# Patient Record
Sex: FEMALE | Race: WHITE | NOT HISPANIC OR LATINO | Employment: UNEMPLOYED
[De-identification: names, ages, dates, MRNs, and addresses within clinical notes are randomized per-mention and may not be internally consistent; named-entity substitution may affect disease eponyms.]

---

## 2017-06-10 ENCOUNTER — HEALTH MAINTENANCE LETTER (OUTPATIENT)
Age: 47
End: 2017-06-10

## 2018-06-16 ENCOUNTER — HEALTH MAINTENANCE LETTER (OUTPATIENT)
Age: 48
End: 2018-06-16

## 2021-04-29 ENCOUNTER — OFFICE VISIT (OUTPATIENT)
Dept: OBGYN | Facility: CLINIC | Age: 51
End: 2021-04-29
Payer: COMMERCIAL

## 2021-04-29 VITALS — DIASTOLIC BLOOD PRESSURE: 76 MMHG | WEIGHT: 195 LBS | SYSTOLIC BLOOD PRESSURE: 120 MMHG | BODY MASS INDEX: 34.27 KG/M2

## 2021-04-29 DIAGNOSIS — Z79.890 HORMONE REPLACEMENT THERAPY: ICD-10-CM

## 2021-04-29 DIAGNOSIS — N95.1 SYMPTOMATIC MENOPAUSAL OR FEMALE CLIMACTERIC STATES: Primary | ICD-10-CM

## 2021-04-29 PROCEDURE — 99202 OFFICE O/P NEW SF 15 MIN: CPT | Performed by: OBSTETRICS & GYNECOLOGY

## 2021-04-29 RX ORDER — MULTIVIT-MIN/IRON/FOLIC ACID/K 18-600-40
CAPSULE ORAL
COMMUNITY

## 2021-04-29 RX ORDER — ESTRADIOL 2 MG/1
2 TABLET ORAL
COMMUNITY
Start: 2021-03-18 | End: 2021-12-09

## 2021-04-29 RX ORDER — PROGESTERONE 200 MG/1
200 CAPSULE ORAL
COMMUNITY
Start: 2021-03-01 | End: 2021-12-09

## 2021-04-29 RX ORDER — SODIUM PHOSPHATE,MONO-DIBASIC 19G-7G/118
1 ENEMA (ML) RECTAL DAILY
COMMUNITY

## 2021-04-29 SDOH — ECONOMIC STABILITY: FOOD INSECURITY: WITHIN THE PAST 12 MONTHS, YOU WORRIED THAT YOUR FOOD WOULD RUN OUT BEFORE YOU GOT MONEY TO BUY MORE.: NOT ASKED

## 2021-04-29 SDOH — ECONOMIC STABILITY: TRANSPORTATION INSECURITY
IN THE PAST 12 MONTHS, HAS LACK OF TRANSPORTATION KEPT YOU FROM MEETINGS, WORK, OR FROM GETTING THINGS NEEDED FOR DAILY LIVING?: NOT ASKED

## 2021-04-29 SDOH — ECONOMIC STABILITY: FOOD INSECURITY: WITHIN THE PAST 12 MONTHS, THE FOOD YOU BOUGHT JUST DIDN'T LAST AND YOU DIDN'T HAVE MONEY TO GET MORE.: NOT ASKED

## 2021-04-29 SDOH — ECONOMIC STABILITY: INCOME INSECURITY: HOW HARD IS IT FOR YOU TO PAY FOR THE VERY BASICS LIKE FOOD, HOUSING, MEDICAL CARE, AND HEATING?: NOT ASKED

## 2021-04-29 SDOH — ECONOMIC STABILITY: TRANSPORTATION INSECURITY
IN THE PAST 12 MONTHS, HAS THE LACK OF TRANSPORTATION KEPT YOU FROM MEDICAL APPOINTMENTS OR FROM GETTING MEDICATIONS?: NOT ASKED

## 2021-04-29 ASSESSMENT — ANXIETY QUESTIONNAIRES
2. NOT BEING ABLE TO STOP OR CONTROL WORRYING: NOT AT ALL
7. FEELING AFRAID AS IF SOMETHING AWFUL MIGHT HAPPEN: NOT AT ALL
IF YOU CHECKED OFF ANY PROBLEMS ON THIS QUESTIONNAIRE, HOW DIFFICULT HAVE THESE PROBLEMS MADE IT FOR YOU TO DO YOUR WORK, TAKE CARE OF THINGS AT HOME, OR GET ALONG WITH OTHER PEOPLE: NOT DIFFICULT AT ALL
GAD7 TOTAL SCORE: 0
1. FEELING NERVOUS, ANXIOUS, OR ON EDGE: NOT AT ALL
6. BECOMING EASILY ANNOYED OR IRRITABLE: NOT AT ALL
5. BEING SO RESTLESS THAT IT IS HARD TO SIT STILL: NOT AT ALL
3. WORRYING TOO MUCH ABOUT DIFFERENT THINGS: NOT AT ALL

## 2021-04-29 ASSESSMENT — PATIENT HEALTH QUESTIONNAIRE - PHQ9
5. POOR APPETITE OR OVEREATING: NOT AT ALL
SUM OF ALL RESPONSES TO PHQ QUESTIONS 1-9: 0

## 2021-04-29 NOTE — PROGRESS NOTES
SUBJECTIVE:                                                   Marielos Bowden is a 51 year old female who presents to clinic today for the following health issue(s):  Patient presents with:  Consult: Discuss hRT, discuss options and sx's      HPI:  New patient --referred by Alissa Chamberlain --here today to establish care and to discuss current HRT regimen.  Began having significant hot flushes in fall 2018 and started on HRT with PCP.  Still having regular monthly menses at that time but was having constant interfering hot flushes.  Noticed improvement with 1mg estrace/200mg prometrium for the first year.  Felt she had worsening symptoms after 1yr and was increased to 2mg estrace daily.  Doing fairly well now --still having night sweats and hot flushes but tolerable.    Menses have become slightly less regular.  Has gone as long as 9 months without period; currently on 3 month of no menses.  +SA --no issues.  Denies vaginal dryness or pain.    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     Patient is sexually active, No obstetric history on file..  Using vasectomy for contraception.    reports that she has never smoked. She has never used smokeless tobacco.    STD testing offered?  Declined    Health maintenance updated:  yes    Today's PHQ-2 Score:   PHQ-2 ( 1999 [employer]) 6/30/2015   Q1: Little interest or pleasure in doing things 0   Q2: Feeling down, depressed or hopeless 0   PHQ-2 Score 0   Q1: Little interest or pleasure in doing things -   Q2: Feeling down, depressed or hopeless -   PHQ-2 Score -     Today's PHQ-9 Score:   PHQ-9 SCORE 4/29/2021   PHQ-9 Total Score 0     Today's DIANE-7  Score:   DIANE-7 SCORE 2021   Total Score 0       Problem list and histories reviewed & adjusted, as indicated.  Additional history: as documented.    Patient Active Problem List   Diagnosis     Family history of colon cancer     CARDIOVASCULAR SCREENING; LDL GOAL LESS THAN 160     History of basal cell carcinoma     Hormone replacement therapy     Past Surgical History:   Procedure Laterality Date     Z NONSPECIFIC PROCEDURE      PE tubes's x 12 secondary to ear infections     Z NONSPECIFIC PROCEDURE      L  TM reconstruction (2nd time)     Z NONSPECIFIC PROCEDURE  child     s/p tonsillectomy & adenoidectomy     Z NONSPECIFIC PROCEDURE  ,     2 NSVDs      Social History     Tobacco Use     Smoking status: Never Smoker     Smokeless tobacco: Never Used   Substance Use Topics     Alcohol use: No      Problem (# of Occurrences) Relation (Name,Age of Onset)    C.A.D. (1) Paternal Grandfather: CABG    Cancer (1) Maternal Grandmother: cervical cancer    Cancer - colorectal (2) Maternal Grandmother (80): recently  , Maternal Aunt: great aunt    Diabetes (1) Maternal Grandmother: great grandmother    Gynecology (1) Maternal Grandmother    Heart Disease (1) Daughter (15): ackerman parkinson with stent    Hypertension (2) Mother, Father    Lipids (1) Father    Lung Cancer (1) Mother:     No Known Problems (5) Sister, Brother, Maternal Grandfather, Paternal Grandmother, Other    Thyroid Disease (1) Maternal Aunt       Negative family history of: Breast Cancer            Current Outpatient Medications   Medication Sig     estradiol (ESTRACE) 2 MG tablet Take 2 mg by mouth     fish oil-omega-3 fatty acids (FISH OIL) 1000 MG capsule Take 3 g by mouth daily     glucosamine-chondroitin 500-400 MG CAPS per capsule Take 1 capsule by mouth daily     guaiFENesin (MUCINEX) 600 MG 12 hr tablet Take 600 mg by mouth daily     loratadine-pseudoePHEDrine (CLARITIN-D 24-HOUR)  MG per tablet Take 1 tablet by  mouth daily     progesterone (PROMETRIUM) 200 MG capsule Take 200 mg by mouth     Vitamin D, Cholecalciferol, 25 MCG (1000 UT) TABS      No current facility-administered medications for this visit.      Allergies   Allergen Reactions     No Known Drug Allergies        ROS:  12 point review of systems negative other than symptoms noted below or in the HPI.  Genitourinary: Hot Flashes, Night Sweats and Spotting  No urinary frequency or dysuria, bladder or kidney problems      OBJECTIVE:     /76   Wt 88.5 kg (195 lb)   LMP 01/20/2021 (Exact Date)   Breastfeeding No   BMI 34.27 kg/m    Body mass index is 34.27 kg/m .    Exam:  Constitutional:  Appearance: Well nourished, well developed alert, in no acute distress  Neurologic:  Mental Status:  Oriented X3.  Normal strength and tone, sensory exam grossly normal, mentation intact and speech normal.    Psychiatric:  Mentation appears normal and affect normal/bright.     In-Clinic Test Results:  No results found for this or any previous visit (from the past 24 hour(s)).    ASSESSMENT/PLAN:                                                        ICD-10-CM    1. Symptomatic menopausal or female climacteric states  N95.1    2. Hormone replacement therapy  Z79.890        Patient Instructions   Reviewed perimenopausal history and hormone replacement therapy to date.  Will continue with current dose of medication; would not recommend increasing dosage of estrogen.  Will continue to monitor cycles/periods and when cycles cease, will likely consider decreasing/weaning HRT.  Will return for yearly exam and mammogram with us in November.      Loretta Michel MD  [facility] [place]

## 2021-04-29 NOTE — PATIENT INSTRUCTIONS
Reviewed perimenopausal history and hormone replacement therapy to date.  Will continue with current dose of medication; would not recommend increasing dosage of estrogen.  Will continue to monitor cycles/periods and when cycles cease, will likely consider decreasing/weaning HRT.  Will return for yearly exam and mammogram with us in November.

## 2021-04-30 ASSESSMENT — ANXIETY QUESTIONNAIRES: GAD7 TOTAL SCORE: 0

## 2021-08-29 ENCOUNTER — HEALTH MAINTENANCE LETTER (OUTPATIENT)
Age: 51
End: 2021-08-29

## 2021-10-24 ENCOUNTER — HEALTH MAINTENANCE LETTER (OUTPATIENT)
Age: 51
End: 2021-10-24

## 2021-10-26 ENCOUNTER — OFFICE VISIT (OUTPATIENT)
Dept: DERMATOLOGY | Facility: CLINIC | Age: 51
End: 2021-10-26
Payer: COMMERCIAL

## 2021-10-26 VITALS — OXYGEN SATURATION: 96 % | HEART RATE: 85 BPM | SYSTOLIC BLOOD PRESSURE: 124 MMHG | DIASTOLIC BLOOD PRESSURE: 86 MMHG

## 2021-10-26 DIAGNOSIS — D18.01 ANGIOMA OF SKIN: ICD-10-CM

## 2021-10-26 DIAGNOSIS — L71.9 ROSACEA: Primary | ICD-10-CM

## 2021-10-26 DIAGNOSIS — L81.4 LENTIGO: ICD-10-CM

## 2021-10-26 DIAGNOSIS — D22.9 NEVUS: ICD-10-CM

## 2021-10-26 DIAGNOSIS — L82.1 SEBORRHEIC KERATOSIS: ICD-10-CM

## 2021-10-26 PROCEDURE — 99203 OFFICE O/P NEW LOW 30 MIN: CPT | Performed by: PHYSICIAN ASSISTANT

## 2021-10-26 RX ORDER — IVERMECTIN 10 MG/G
CREAM TOPICAL
Qty: 45 G | Refills: 3 | Status: SHIPPED | OUTPATIENT
Start: 2021-10-26 | End: 2022-11-01

## 2021-10-26 NOTE — LETTER
10/26/2021         RE: Marielos Bowden  [id number] [address] [id number]        Dear Colleague,    Thank you for referring your patient, Marielos Bowden, to the [facility]. Please see a copy of my visit note below.    HPI:   Chief complaints: Marielos Bowden is a pleasant 51 year old female who presents for Full skin cancer screening to rule out skin cancer   Last Skin Exam: n/a      1st Baseline: yes  Personal HX of Skin Cancer: no   Personal HX of Malignant Melanoma: no   Family HX of Skin Cancer / Malignant Melanoma: no  Personal HX of Atypical Moles:   no  Risk factors: history of sun exposure and burns  New / Changing lesions:yes spot on the breast - was removed previously but came back  Social History:   On review of systems, there are no further skin complaints, patient is feeling otherwise well.   ROS of the following were done and are negative: Constitutional, Eyes, Ears, Nose,   Mouth, Throat, Cardiovascular, Respiratory, GI, Genitourinary, Musculoskeletal,   Psychiatric, Endocrine, Allergic/Immunologic.    PHYSICAL EXAM:   BP (!) 131/90   Pulse 85   SpO2 96%   Skin exam performed as follows: Type 2 skin. Mood appropriate  Alert and Oriented X 3. Well developed, well nourished in no distress.  General appearance: Normal  Head including face: Normal  Eyes: conjunctiva and lids: Normal  Mouth: Lips, teeth, gums: Normal  Neck: Normal  Chest-breast/axillae: Normal  Back: Normal  Spleen and liver: Normal  Cardiovascular: Exam of peripheral vascular system by observation for swelling, varicosities, edema: Normal  Genitalia: groin, buttocks: Normal  Extremities: digits/nails (clubbing): Normal  Eccrine and Apocrine glands: Normal  Right upper extremity: Normal  Left upper extremity: Normal  Right lower extremity: Normal  Left lower extremity: Normal  Skin: Scalp and body hair: See below    Pt deferred exam of breasts, groin, buttocks: No    Other physical findings:  1.  Multiple pigmented macules on extremities and trunk  2. Multiple pigmented macules on face, trunk and extremities  3. Multiple vascular papules on trunk, arms and legs  4. Multiple scattered keratotic plaques  5. 7 mm fleshy papule on the right breast       Except as noted above, no other signs of skin cancer or melanoma.     ASSESSMENT/PLAN:   Benign Full skin cancer screening today. . Patient with history of none  Advised on monthly self exams and 1 year  Patient Education: Appropriate brochures given.    1. Multiple benign appearing nevi on arms, legs and trunk. Discussed ABCDEs of melanoma and sunscreen.   2. Multiple lentigos on arms, legs and trunk. Advised benign, no treatment needed.  3. Multiple scattered angiomas. Advised benign, no treatment needed.   4. Seborrheic keratosis on arms, legs and trunk. Advised benign, no treatment needed.  5. Dermal nevus vs nevus lipomatous on the right breast - she will consider removal; codes given for her to check with insurance  6. History of rosacea - Soolantra works well for her. Refill provided.               Follow-up: yearly    1.) Patient was asked about new and changing moles. YES  2.) Patient received a complete physical skin examination: YES  3.) Patient was counseled to perform a monthly self skin examination: YES  Scribed By: Nicci Vance, MS, PAKimmieC          Again, thank you for allowing me to participate in the care of your patient.        Sincerely,        Nicci Vance PA-C

## 2021-10-26 NOTE — PROGRESS NOTES
HPI:   Chief complaints: Marielos Bowden is a pleasant 51 year old female who presents for Full skin cancer screening to rule out skin cancer   Last Skin Exam: n/a      1st Baseline: yes  Personal HX of Skin Cancer: no   Personal HX of Malignant Melanoma: no   Family HX of Skin Cancer / Malignant Melanoma: no  Personal HX of Atypical Moles:   no  Risk factors: history of sun exposure and burns  New / Changing lesions:yes spot on the breast - was removed previously but came back  Social History:   On review of systems, there are no further skin complaints, patient is feeling otherwise well.   ROS of the following were done and are negative: Constitutional, Eyes, Ears, Nose,   Mouth, Throat, Cardiovascular, Respiratory, GI, Genitourinary, Musculoskeletal,   Psychiatric, Endocrine, Allergic/Immunologic.    PHYSICAL EXAM:   BP (!) 131/90   Pulse 85   SpO2 96%   Skin exam performed as follows: Type 2 skin. Mood appropriate  Alert and Oriented X 3. Well developed, well nourished in no distress.  General appearance: Normal  Head including face: Normal  Eyes: conjunctiva and lids: Normal  Mouth: Lips, teeth, gums: Normal  Neck: Normal  Chest-breast/axillae: Normal  Back: Normal  Spleen and liver: Normal  Cardiovascular: Exam of peripheral vascular system by observation for swelling, varicosities, edema: Normal  Genitalia: groin, buttocks: Normal  Extremities: digits/nails (clubbing): Normal  Eccrine and Apocrine glands: Normal  Right upper extremity: Normal  Left upper extremity: Normal  Right lower extremity: Normal  Left lower extremity: Normal  Skin: Scalp and body hair: See below    Pt deferred exam of breasts, groin, buttocks: No    Other physical findings:  1. Multiple pigmented macules on extremities and trunk  2. Multiple pigmented macules on face, trunk and extremities  3. Multiple vascular papules on trunk, arms and legs  4. Multiple scattered keratotic plaques  5. 7 mm fleshy papule on the right breast        Except as noted above, no other signs of skin cancer or melanoma.     ASSESSMENT/PLAN:   Benign Full skin cancer screening today. . Patient with history of none  Advised on monthly self exams and 1 year  Patient Education: Appropriate brochures given.    1. Multiple benign appearing nevi on arms, legs and trunk. Discussed ABCDEs of melanoma and sunscreen.   2. Multiple lentigos on arms, legs and trunk. Advised benign, no treatment needed.  3. Multiple scattered angiomas. Advised benign, no treatment needed.   4. Seborrheic keratosis on arms, legs and trunk. Advised benign, no treatment needed.  5. Dermal nevus vs nevus lipomatous on the right breast - she will consider removal; codes given for her to check with insurance  6. History of rosacea - Soolantra works well for her. Refill provided.               Follow-up: yearly    1.) Patient was asked about new and changing moles. YES  2.) Patient received a complete physical skin examination: YES  3.) Patient was counseled to perform a monthly self skin examination: YES  Scribed By: Nicci Vance MS, PA-C

## 2021-12-09 ENCOUNTER — TELEPHONE (OUTPATIENT)
Dept: OBGYN | Facility: CLINIC | Age: 51
End: 2021-12-09
Payer: COMMERCIAL

## 2021-12-09 NOTE — TELEPHONE ENCOUNTER
Pt is req refills on her progesterone (PROMETRIUM) 200 MG capsule- pharmacy states they are not able to fill it, their database is saying Dr. Michel is not able to do this. Pt is scheduled for her yrly on 12/16 already    Pharmacy: [id number] - [place], [place] - [address]

## 2021-12-09 NOTE — TELEPHONE ENCOUNTER
Duplicate - pt sent mychart and that was sent to Dr Michel as well.    Karla De Jesus RN on 12/9/2021 at 11:51 AM

## 2021-12-15 NOTE — PROGRESS NOTES
Marielos is a 51 year old  female who presents for annual exam.     Besides routine health maintenance,  she would like to discuss the fact that she went 9 months with out bleeding and then has been having some spotting since . She would also like to revisit her hormones.    HPI:  Here today for yearly exam --doing well.  Perimenopausal.  Has been on HRT since 2018 for hot flushes/night sweats.  Was increased to 2mg daily estrace last year.  Hot flushes and night sweats have been fairly well controlled.  Had not had period since Feb and now has had intermittent bleeding/spotting since .  Started with very heavy bleeding x 10d and now having spotting here and there.  Initially had some upper thigh soreness/cramping.   Nothing since.  +SA but infrequent due to lower libido.  No dryness or pain but does occ using lubricant.  No bowel/bladder issues.  Denies leaking or incontinence.  Gets up on occ overnight to void.    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); 2 daughters --youngest will graduate from college this month andwill move home as she figures out next steps  -staying active in the summer; likes to hike but  doesn't like to walk in the winter; tries to park further away, uses stairs, etc  +mammo today; +SBE on occ --no concerns  No PCP --believes she had bloodwork in the last couple of years but reports no history of diabetes or elevated cholesterol, etc  -had colonoscopy in 2019 (see care everywhere)--will repeat in 5yrs  -has never had bone scan  -completed both covid vaccine and booster; had flu shot already with work        GYNECOLOGIC HISTORY:    No LMP recorded. (Menstrual status: Irregular Periods).    Regular menses? No, went 9 months without a menses and then had some spotting  Her current contraception method is: vasectomy.  She  reports that she has never smoked. She has never used smokeless tobacco.    Patient is sexually  active.  STD testing offered?  Declined  Last PHQ-9 score on record =   PHQ-9 SCORE 2021   PHQ-9 Total Score 0     Last GAD7 score on record =   DIANE-7 SCORE 2021   Total Score 0     Alcohol Score = 0    HEALTH MAINTENANCE:  Cholesterol:2018   Total= 274, Triglycerides=87, HDL=80, DZQ=988, FBS=93, TSH (2020)=2.11  Last Mammo: One year ago, Result: Normal, Next Mammo: Today  Pap:   Lab Results   Component Value Date    PAP NIL 2014    PAP NIL 2009     Colonoscopy: 2019, Result: Polyp, Next Colonoscopy: 3 years.  Dexa:  N/a    Health maintenance updated:  yes    HISTORY:  OB History    Para Term  AB Living   2 2 2 0 0 2   SAB IAB Ectopic Multiple Live Births   0 0 0 0 2      # Outcome Date GA Lbr Axel/2nd Weight Sex Delivery Anes PTL Lv   2 Term 1937    F    JIN   1 Term  38w0d   F    JIN       Patient Active Problem List   Diagnosis     Family history of colon cancer     CARDIOVASCULAR SCREENING; LDL GOAL LESS THAN 160     History of basal cell carcinoma     Hormone replacement therapy     Past Surgical History:   Procedure Laterality Date     ZZC NONSPECIFIC PROCEDURE      PE tubes's x 12 secondary to ear infections     ZZC NONSPECIFIC PROCEDURE      L  TM reconstruction (2nd time)     ZZC NONSPECIFIC PROCEDURE  child     s/p tonsillectomy & adenoidectomy     ZZC NONSPECIFIC PROCEDURE  ,     2 NSVDs      Social History     Tobacco Use     Smoking status: Never Smoker     Smokeless tobacco: Never Used   Substance Use Topics     Alcohol use: No      Problem (# of Occurrences) Relation (Name,Age of Onset)    Basal cell carcinoma (2) Father, Paternal Grandmother    C.A.D. (1) Paternal Grandfather: CABG    Cancer (1) Maternal Grandmother: cervical cancer    Cancer - colorectal (2) Maternal Grandmother (80): recently  , Maternal Aunt: great aunt    Diabetes (1) Maternal Grandmother: great grandmother    Gynecology (1) Maternal Grandmother     "Heart Disease (1) Daughter (15): meka parkinson with stent    Hypertension (2) Mother, Father    Lipids (1) Father    Lung Cancer (1) Mother: 2016    No Known Problems (4) Sister, Brother, Maternal Grandfather, Other    Skin Cancer (1) Maternal Aunt       Negative family history of: Breast Cancer            Current Outpatient Medications   Medication Sig     estradiol (ESTRACE) 2 MG tablet Take 1 tablet (2 mg) by mouth daily     fish oil-omega-3 fatty acids (FISH OIL) 1000 MG capsule Take 3 g by mouth daily      glucosamine-chondroitin 500-400 MG CAPS per capsule Take 1 capsule by mouth daily     guaiFENesin (MUCINEX) 600 MG 12 hr tablet Take 600 mg by mouth daily     ivermectin (SOOLANTRA) 1 % cream Apply to the affected areas of the face once daily.     loratadine-pseudoePHEDrine (CLARITIN-D 24-HOUR)  MG per tablet Take 1 tablet by mouth daily     progesterone (PROMETRIUM) 200 MG capsule Take 1 capsule (200 mg) by mouth daily     Vitamin D, Cholecalciferol, 25 MCG (1000 UT) TABS      No current facility-administered medications for this visit.     Allergies   Allergen Reactions     No Known Drug Allergies        Past medical, surgical, social and family histories were reviewed and updated in EPIC.    ROS:   12 point review of systems negative other than symptoms noted below or in the HPI.  No urinary frequency or dysuria, bladder or kidney problems    EXAM:  /80   Pulse 70   Ht 1.6 m (5' 3\")   Wt 83 kg (183 lb)   Breastfeeding No   BMI 32.42 kg/m     BMI: Body mass index is 32.42 kg/m .    PHYSICAL EXAM:  Constitutional:   Appearance: Well nourished, well developed, alert, in no acute distress  Neck:  Lymph Nodes:  No lymphadenopathy present    Thyroid:  Gland size normal, nontender, no nodules or masses present  on palpation  Chest:  Respiratory Effort:  Breathing unlabored  Cardiovascular:    Heart: Auscultation:  Regular rate, normal rhythm, no murmurs present  Breasts: Inspection of Breasts:  " No lymphadenopathy present., Palpation of Breasts and Axillae:  No masses present on palpation, no breast tenderness., Axillary Lymph Nodes:  No lymphadenopathy present. and No nodularity, asymmetry or nipple discharge bilaterally.  Gastrointestinal:   Abdominal Examination:  Abdomen nontender to palpation, tone normal without rigidity or guarding, no masses present, umbilicus without lesions   Liver and Spleen:  No hepatomegaly present, liver nontender to palpation    Hernias:  No hernias present  Lymphatic: Lymph Nodes:  No other lymphadenopathy present  Skin:  General Inspection:  No rashes present, no lesions present, no areas of  discoloration  Neurologic:    Mental Status:  Oriented X3.  Normal strength and tone, sensory exam                grossly normal, mentation intact and speech normal.    Psychiatric:   Mentation appears normal and affect normal/bright.         Pelvic Exam:  External Genitalia:     Normal appearance for age, no discharge present, no tenderness present, no inflammatory lesions present, color normal  Vagina:     Normal vaginal vault without central or paravaginal defects, no discharge present, no inflammatory lesions present, no masses present  Bladder:     Nontender to palpation  Urethra:   Urethral Body:  Urethra palpation normal, urethra structural support normal   Urethral Meatus:  No erythema or lesions present  Cervix:     Appearance healthy, no lesions present, nontender to palpation, no bleeding present  Uterus:     Uterus: firm, normal sized and nontender, anteverted in position.  Enlarged 12-14wk size uterus; firm, mobile, nontender  Adnexa:     No adnexal tenderness present, no adnexal masses present  Perineum:     Perineum within normal limits, no evidence of trauma, no rashes or skin lesions present  Anus:     Anus within normal limits, no hemorrhoids present  Inguinal Lymph Nodes:     No lymphadenopathy present  Pubic Hair:     Normal pubic hair distribution for  age  Genitalia and Groin:     No rashes present, no lesions present, no areas of discoloration, no masses present      COUNSELING:   Reviewed preventive health counseling, as reflected in patient instructions  Special attention given to:        Regular exercise       Healthy diet/nutrition       (Leonor)menopause management    BMI: Body mass index is 32.42 kg/m .  Weight management plan: Discussed healthy diet and exercise guidelines    ASSESSMENT:  51 year old female with satisfactory annual exam.    ICD-10-CM    1. Encounter for gynecological examination without abnormal finding  Z01.419 Pap thin layer screen with HPV - recommended age 30 - 65 years   2. Symptomatic menopausal or female climacteric states  N95.1 estradiol (ESTRACE) 2 MG tablet     progesterone (PROMETRIUM) 200 MG capsule   3. Hormone replacement therapy  Z79.890 estradiol (ESTRACE) 2 MG tablet     progesterone (PROMETRIUM) 200 MG capsule   4. Menometrorrhagia  N92.1 ENDOMETRIAL BIOPSY W/O CERVICAL DILATION     Surgical pathology exam     US Transvaginal Pelvic Non-OB   5. Bulky or enlarged uterus  N85.2 US Transvaginal Pelvic Non-OB       PLAN:  Patient Instructions   Follow up with your primary care provider for your other medical problems.  Continue self breast exam.  Increase physical activity and exercise.  Lab and pap smear results will be called to the patient.  Co-testing done today and will repeat in 5yrs if normal.  WHI study was discussed in detail including current information.  The patient requests continuation of hormone therapy.  Will consider slowly weaning dosage of HRT --discussed possibility of alternating 2mg and 1mg dosing over the next year.  Will consider.  Usual safety and preventative measures counseling done.  BMI >25  Weight loss encouraged.  Endometrial biopsy done today due to irregular bleeding/metrorrhagia.  I will call Marielos with biopsy results next week.  Will also arrange pelvic ultrasound due to bleeding and uterine  enlargement appreciated on exam today.        Loretta Michel MD  INDICATIONS:                                                    Is a pregnancy test required: No.  Was a consent obtained?  No (verbal consent obtained)    Having endometrial biopsy for menometrorrhagia    Today's PHQ-2 Score:   PHQ-2 ( 1999 [employer]) 6/30/2015   Q1: Little interest or pleasure in doing things 0   Q2: Feeling down, depressed or hopeless 0   PHQ-2 Score 0   Q1: Little interest or pleasure in doing things -   Q2: Feeling down, depressed or hopeless -   PHQ-2 Score -       PROCEDURE;                                                      A speculum was placed in the vagina and cervix prepped with betadine. A tenaculum was attached to the cervix. A small plastic 5 mm Pipelle syringe curette was inserted into the cervical canal. The uterus was sounded to 11 cm's. A vigorous four quadrant biopsy was performed, removing amount moderate  of tissue. The speculum was removed. This tissue was placed in Formalin and sent to pathology.    The patient tolerated the procedure  well and she reported there was no cramping.      POST PROCEDURE;                                                      There  was no cramping at the time of discharge. She  tolerated the procedure well with minimal discomfort. There were no complications. Patient was discharged in stable condition.    Patient advised to call the clinic if severe pelvic pain, fever or heavy bleeding.    Loretta Michel MD

## 2021-12-16 ENCOUNTER — OFFICE VISIT (OUTPATIENT)
Dept: OBGYN | Facility: CLINIC | Age: 51
End: 2021-12-16
Payer: COMMERCIAL

## 2021-12-16 ENCOUNTER — ANCILLARY PROCEDURE (OUTPATIENT)
Dept: MAMMOGRAPHY | Facility: CLINIC | Age: 51
End: 2021-12-16
Payer: COMMERCIAL

## 2021-12-16 VITALS
BODY MASS INDEX: 32.43 KG/M2 | SYSTOLIC BLOOD PRESSURE: 110 MMHG | HEIGHT: 63 IN | WEIGHT: 183 LBS | HEART RATE: 70 BPM | DIASTOLIC BLOOD PRESSURE: 80 MMHG

## 2021-12-16 DIAGNOSIS — N92.1 MENOMETRORRHAGIA: ICD-10-CM

## 2021-12-16 DIAGNOSIS — Z79.890 HORMONE REPLACEMENT THERAPY: ICD-10-CM

## 2021-12-16 DIAGNOSIS — N95.1 SYMPTOMATIC MENOPAUSAL OR FEMALE CLIMACTERIC STATES: ICD-10-CM

## 2021-12-16 DIAGNOSIS — Z01.419 ENCOUNTER FOR GYNECOLOGICAL EXAMINATION WITHOUT ABNORMAL FINDING: Primary | ICD-10-CM

## 2021-12-16 DIAGNOSIS — Z12.31 VISIT FOR SCREENING MAMMOGRAM: ICD-10-CM

## 2021-12-16 DIAGNOSIS — N85.2 BULKY OR ENLARGED UTERUS: ICD-10-CM

## 2021-12-16 PROCEDURE — 99396 PREV VISIT EST AGE 40-64: CPT | Mod: 25 | Performed by: OBSTETRICS & GYNECOLOGY

## 2021-12-16 PROCEDURE — G0123 SCREEN CERV/VAG THIN LAYER: HCPCS | Performed by: OBSTETRICS & GYNECOLOGY

## 2021-12-16 PROCEDURE — 77067 SCR MAMMO BI INCL CAD: CPT | Mod: TC | Performed by: RADIOLOGY

## 2021-12-16 PROCEDURE — 87624 HPV HI-RISK TYP POOLED RSLT: CPT | Performed by: OBSTETRICS & GYNECOLOGY

## 2021-12-16 PROCEDURE — 77063 BREAST TOMOSYNTHESIS BI: CPT | Mod: TC | Performed by: RADIOLOGY

## 2021-12-16 PROCEDURE — 58100 BIOPSY OF UTERUS LINING: CPT | Performed by: OBSTETRICS & GYNECOLOGY

## 2021-12-16 PROCEDURE — 88305 TISSUE EXAM BY PATHOLOGIST: CPT | Performed by: PATHOLOGY

## 2021-12-16 PROCEDURE — 99213 OFFICE O/P EST LOW 20 MIN: CPT | Mod: 25 | Performed by: OBSTETRICS & GYNECOLOGY

## 2021-12-16 RX ORDER — PROGESTERONE 200 MG/1
200 CAPSULE ORAL DAILY
Qty: 90 CAPSULE | Refills: 3 | Status: SHIPPED | OUTPATIENT
Start: 2021-12-16 | End: 2022-12-29

## 2021-12-16 RX ORDER — ESTRADIOL 2 MG/1
2 TABLET ORAL DAILY
Qty: 90 TABLET | Refills: 3 | Status: SHIPPED | OUTPATIENT
Start: 2021-12-16 | End: 2022-12-29

## 2021-12-16 ASSESSMENT — ANXIETY QUESTIONNAIRES
GAD7 TOTAL SCORE: 0
6. BECOMING EASILY ANNOYED OR IRRITABLE: NOT AT ALL
7. FEELING AFRAID AS IF SOMETHING AWFUL MIGHT HAPPEN: NOT AT ALL
IF YOU CHECKED OFF ANY PROBLEMS ON THIS QUESTIONNAIRE, HOW DIFFICULT HAVE THESE PROBLEMS MADE IT FOR YOU TO DO YOUR WORK, TAKE CARE OF THINGS AT HOME, OR GET ALONG WITH OTHER PEOPLE: NOT DIFFICULT AT ALL
3. WORRYING TOO MUCH ABOUT DIFFERENT THINGS: NOT AT ALL
2. NOT BEING ABLE TO STOP OR CONTROL WORRYING: NOT AT ALL
5. BEING SO RESTLESS THAT IT IS HARD TO SIT STILL: NOT AT ALL
1. FEELING NERVOUS, ANXIOUS, OR ON EDGE: NOT AT ALL

## 2021-12-16 ASSESSMENT — PATIENT HEALTH QUESTIONNAIRE - PHQ9
SUM OF ALL RESPONSES TO PHQ QUESTIONS 1-9: 0
5. POOR APPETITE OR OVEREATING: NOT AT ALL

## 2021-12-16 ASSESSMENT — MIFFLIN-ST. JEOR: SCORE: 1404.21

## 2021-12-16 NOTE — PATIENT INSTRUCTIONS
Follow up with your primary care provider for your other medical problems.  Continue self breast exam.  Increase physical activity and exercise.  Lab and pap smear results will be called to the patient.  Co-testing done today and will repeat in 5yrs if normal.  WHI study was discussed in detail including current information.  The patient requests continuation of hormone therapy.  Will consider slowly weaning dosage of HRT --discussed possibility of alternating 2mg and 1mg dosing over the next year.  Will consider.  Usual safety and preventative measures counseling done.  BMI >25  Weight loss encouraged.  Endometrial biopsy done today due to irregular bleeding/metrorrhagia.  I will call Marielos with biopsy results next week.  Will also arrange pelvic ultrasound due to bleeding and uterine enlargement appreciated on exam today.

## 2021-12-17 ASSESSMENT — ANXIETY QUESTIONNAIRES: GAD7 TOTAL SCORE: 0

## 2021-12-20 LAB
PATH REPORT.COMMENTS IMP SPEC: NORMAL
PATH REPORT.COMMENTS IMP SPEC: NORMAL
PATH REPORT.FINAL DX SPEC: NORMAL
PATH REPORT.GROSS SPEC: NORMAL
PATH REPORT.MICROSCOPIC SPEC OTHER STN: NORMAL
PATH REPORT.RELEVANT HX SPEC: NORMAL
PHOTO IMAGE: NORMAL

## 2021-12-22 LAB
BKR LAB AP GYN ADEQUACY: NORMAL
BKR LAB AP GYN INTERPRETATION: NORMAL
BKR LAB AP HPV REFLEX: NORMAL
BKR LAB AP PREVIOUS ABNORMAL: NORMAL
PATH REPORT.COMMENTS IMP SPEC: NORMAL
PATH REPORT.COMMENTS IMP SPEC: NORMAL
PATH REPORT.RELEVANT HX SPEC: NORMAL

## 2021-12-24 LAB
HUMAN PAPILLOMA VIRUS 16 DNA: NEGATIVE
HUMAN PAPILLOMA VIRUS 18 DNA: NEGATIVE
HUMAN PAPILLOMA VIRUS FINAL DIAGNOSIS: NORMAL
HUMAN PAPILLOMA VIRUS OTHER HR: NEGATIVE

## 2022-01-02 ENCOUNTER — MYC MEDICAL ADVICE (OUTPATIENT)
Dept: OBGYN | Facility: CLINIC | Age: 52
End: 2022-01-02
Payer: COMMERCIAL

## 2022-02-01 ENCOUNTER — LAB REQUISITION (OUTPATIENT)
Dept: LAB | Facility: CLINIC | Age: 52
End: 2022-02-01

## 2022-02-01 PROCEDURE — U0003 INFECTIOUS AGENT DETECTION BY NUCLEIC ACID (DNA OR RNA); SEVERE ACUTE RESPIRATORY SYNDROME CORONAVIRUS 2 (SARS-COV-2) (CORONAVIRUS DISEASE [COVID-19]), AMPLIFIED PROBE TECHNIQUE, MAKING USE OF HIGH THROUGHPUT TECHNOLOGIES AS DESCRIBED BY CMS-2020-01-R: HCPCS | Performed by: INTERNAL MEDICINE

## 2022-02-02 LAB — SARS-COV-2 RNA RESP QL NAA+PROBE: NOT DETECTED

## 2022-02-08 PROCEDURE — U0005 INFEC AGEN DETEC AMPLI PROBE: HCPCS | Performed by: INTERNAL MEDICINE

## 2022-02-09 ENCOUNTER — LAB REQUISITION (OUTPATIENT)
Dept: LAB | Facility: CLINIC | Age: 52
End: 2022-02-09

## 2022-02-09 LAB — SARS-COV-2 RNA RESP QL NAA+PROBE: NEGATIVE

## 2022-02-22 ENCOUNTER — LAB REQUISITION (OUTPATIENT)
Dept: LAB | Facility: CLINIC | Age: 52
End: 2022-02-22

## 2022-02-22 PROCEDURE — U0003 INFECTIOUS AGENT DETECTION BY NUCLEIC ACID (DNA OR RNA); SEVERE ACUTE RESPIRATORY SYNDROME CORONAVIRUS 2 (SARS-COV-2) (CORONAVIRUS DISEASE [COVID-19]), AMPLIFIED PROBE TECHNIQUE, MAKING USE OF HIGH THROUGHPUT TECHNOLOGIES AS DESCRIBED BY CMS-2020-01-R: HCPCS | Performed by: INTERNAL MEDICINE

## 2022-02-23 LAB — SARS-COV-2 RNA RESP QL NAA+PROBE: NEGATIVE

## 2022-03-01 ENCOUNTER — LAB REQUISITION (OUTPATIENT)
Dept: LAB | Facility: CLINIC | Age: 52
End: 2022-03-01

## 2022-03-01 PROCEDURE — U0005 INFEC AGEN DETEC AMPLI PROBE: HCPCS | Performed by: INTERNAL MEDICINE

## 2022-03-02 LAB — SARS-COV-2 RNA RESP QL NAA+PROBE: NEGATIVE

## 2022-03-08 PROCEDURE — U0003 INFECTIOUS AGENT DETECTION BY NUCLEIC ACID (DNA OR RNA); SEVERE ACUTE RESPIRATORY SYNDROME CORONAVIRUS 2 (SARS-COV-2) (CORONAVIRUS DISEASE [COVID-19]), AMPLIFIED PROBE TECHNIQUE, MAKING USE OF HIGH THROUGHPUT TECHNOLOGIES AS DESCRIBED BY CMS-2020-01-R: HCPCS | Performed by: INTERNAL MEDICINE

## 2022-03-09 ENCOUNTER — LAB REQUISITION (OUTPATIENT)
Dept: LAB | Facility: CLINIC | Age: 52
End: 2022-03-09

## 2022-03-09 LAB — SARS-COV-2 RNA RESP QL NAA+PROBE: NEGATIVE

## 2022-03-15 ENCOUNTER — LAB REQUISITION (OUTPATIENT)
Dept: LAB | Facility: CLINIC | Age: 52
End: 2022-03-15

## 2022-03-15 PROCEDURE — U0003 INFECTIOUS AGENT DETECTION BY NUCLEIC ACID (DNA OR RNA); SEVERE ACUTE RESPIRATORY SYNDROME CORONAVIRUS 2 (SARS-COV-2) (CORONAVIRUS DISEASE [COVID-19]), AMPLIFIED PROBE TECHNIQUE, MAKING USE OF HIGH THROUGHPUT TECHNOLOGIES AS DESCRIBED BY CMS-2020-01-R: HCPCS | Performed by: INTERNAL MEDICINE

## 2022-03-16 LAB — SARS-COV-2 RNA RESP QL NAA+PROBE: NEGATIVE

## 2022-03-22 ENCOUNTER — LAB REQUISITION (OUTPATIENT)
Dept: LAB | Facility: CLINIC | Age: 52
End: 2022-03-22

## 2022-03-22 PROCEDURE — U0005 INFEC AGEN DETEC AMPLI PROBE: HCPCS | Performed by: INTERNAL MEDICINE

## 2022-03-23 LAB — SARS-COV-2 RNA RESP QL NAA+PROBE: NEGATIVE

## 2022-03-29 ENCOUNTER — LAB REQUISITION (OUTPATIENT)
Dept: LAB | Facility: CLINIC | Age: 52
End: 2022-03-29

## 2022-03-29 PROCEDURE — U0005 INFEC AGEN DETEC AMPLI PROBE: HCPCS | Performed by: INTERNAL MEDICINE

## 2022-03-30 LAB — SARS-COV-2 RNA RESP QL NAA+PROBE: NEGATIVE

## 2022-07-29 ENCOUNTER — OFFICE VISIT (OUTPATIENT)
Dept: DERMATOLOGY | Facility: CLINIC | Age: 52
End: 2022-07-29
Payer: COMMERCIAL

## 2022-07-29 DIAGNOSIS — D48.5 NEOPLASM OF UNCERTAIN BEHAVIOR OF SKIN: ICD-10-CM

## 2022-07-29 PROCEDURE — 88305 TISSUE EXAM BY PATHOLOGIST: CPT | Performed by: PATHOLOGY

## 2022-07-29 PROCEDURE — 11102 TANGNTL BX SKIN SINGLE LES: CPT | Performed by: PHYSICIAN ASSISTANT

## 2022-07-29 ASSESSMENT — PAIN SCALES - GENERAL: PAINLEVEL: NO PAIN (0)

## 2022-07-29 NOTE — PROGRESS NOTES
HPI:   Chief complaints: Marielos Bowden is a pleasant 52 year old female who presents for removal of an irritated mole on the right breast. The area is irritated.       PHYSICAL EXAM:    Breastfeeding No   Skin exam performed as follows: Type 2 skin. Mood appropriate  Alert and Oriented X 3. Well developed, well nourished in no distress.  General appearance: Normal  Head including face: Normal  Eyes: conjunctiva and lids: Normal  Mouth: Lips, teeth, gums: Normal  Neck: Normal  Cardiovascular: Exam of peripheral vascular system by observation for swelling, varicosities, edema: Normal  Right upper extremity: Normal  Left upper extremity: Normal  Right lower extremity: Normal  Left lower extremity: Normal  Skin: Scalp and body hair: See below    7 mm tan fleshy papule on the right medial breast    ASSESSMENT/PLAN:     1. Dermal nevus r/o atypicality on the right medial breast. Shave biopsy performed.  Area cleaned and anesthetized with 1% lidocaine with epinephrine.  Dermablade used to remove the lesion and sent to pathology. Bleeding was cauterized. Pt tolerated procedure well with no complications.             Follow-up: PRN  CC:   Scribed By: Nicci Vance MS, PA-C

## 2022-07-29 NOTE — LETTER
7/29/2022         RE: Marielos Bowden  [address] [facility] [facility] ([place] [id number]        Dear Colleague,    Thank you for referring your patient, Marielos Bowden, to the [facility]. Please see a copy of my visit note below.    HPI:   Chief complaints: Marielos Bowden is a pleasant 52 year old female who presents for removal of an irritated mole on the right breast. The area is irritated.       PHYSICAL EXAM:    Breastfeeding No   Skin exam performed as follows: Type 2 skin. Mood appropriate  Alert and Oriented X 3. Well developed, well nourished in no distress.  General appearance: Normal  Head including face: Normal  Eyes: conjunctiva and lids: Normal  Mouth: Lips, teeth, gums: Normal  Neck: Normal  Cardiovascular: Exam of peripheral vascular system by observation for swelling, varicosities, edema: Normal  Right upper extremity: Normal  Left upper extremity: Normal  Right lower extremity: Normal  Left lower extremity: Normal  Skin: Scalp and body hair: See below    7 mm tan fleshy papule on the right medial breast    ASSESSMENT/PLAN:     1. Dermal nevus r/o atypicality on the right medial breast. Shave biopsy performed.  Area cleaned and anesthetized with 1% lidocaine with epinephrine.  Dermablade used to remove the lesion and sent to pathology. Bleeding was cauterized. Pt tolerated procedure well with no complications.             Follow-up: PRN  CC:   Scribed By: Nicci Vance MS, KIRSTEN          Again, thank you for allowing me to participate in the care of your patient.        Sincerely,        Nicci Vance PA-C

## 2022-07-29 NOTE — PATIENT INSTRUCTIONS
Wound Care Instructions     FOR SUPERFICIAL WOUNDS     [facility]  [phone]                 AFTER 24 HOURS YOU SHOULD REMOVE THE BANDAGE AND BEGIN DAILY DRESSING CHANGES AS FOLLOWS:     1) Remove Dressing.     2) Clean and dry the area with tap water using a Q-tip or sterile gauze pad.     3) Apply Vaseline, Aquaphor, Polysporin ointment or Bacitracin ointment over entire wound.  Do NOT use Neosporin ointment.     4) Cover the wound with a band-aid, or a sterile non-stick gauze pad and micropore paper tape    REPEAT THESE INSTRUCTIONS AT LEAST ONCE A DAY UNTIL THE WOUND HAS COMPLETELY HEALED.    It is an old wives tale that a wound heals better when it is exposed to air and allowed to dry out. The wound will heal faster with a better cosmetic result if it is kept moist with ointment and covered with a bandage.    **Do not let the wound dry out.**    Supplies Needed:      *Cotton tipped applicators (Q-tips)    *Vaseline, Aquaphor, Polysporin or Bacitracin Ointment (NOT NEOSPORIN)    *Band-aids or non-stick gauze pads and micropore paper tape.      PATIENT INFORMATION:    During the healing process you will notice a number of changes. All wounds develop a small halo of redness surrounding the wound.  This means healing is occurring. Severe itching with extensive redness usually indicates sensitivity to the ointment or bandage tape used to dress the wound.  You should call our office if this develops.      Swelling  and/or discoloration around your surgical site is common, particularly when performed around the eye.    All wounds normally drain.  The larger the wound the more drainage there will be.  After 7-10 days, you will notice the wound beginning to shrink and new skin will begin to grow.  The wound is healed when you can see skin has formed over the entire area.  A healed wound has a healthy, shiny look to the surface and is red to dark pink in color to normalize.  Wounds may  take approximately 4-6 weeks to heal.  Larger wounds may take 6-8 weeks.  After the wound is healed you may discontinue dressing changes.    You may experience a sensation of tightness as your wound heals. This is normal and will gradually subside.    Your healed wound may be sensitive to temperature changes. This sensitivity improves with time, but if you re having a lot of discomfort, try to avoid temperature extremes.    Patients frequently experience itching after their wound appears to have healed because of the continue healing under the skin.  Plain Vaseline will help relieve the itching.      POSSIBLE COMPLICATIONS    BLEEDING:    Leave the bandage in place.  Use tightly rolled up gauze or a cloth to apply direct pressure over the bandage for 30  minutes.  Reapply pressure for an additional 30 minutes if necessary  Use additional gauze and tape to maintain pressure once the bleeding has stopped.

## 2022-08-02 LAB
PATH REPORT.COMMENTS IMP SPEC: NORMAL
PATH REPORT.COMMENTS IMP SPEC: NORMAL
PATH REPORT.FINAL DX SPEC: NORMAL
PATH REPORT.GROSS SPEC: NORMAL
PATH REPORT.MICROSCOPIC SPEC OTHER STN: NORMAL
PATH REPORT.RELEVANT HX SPEC: NORMAL

## 2022-10-15 ENCOUNTER — HEALTH MAINTENANCE LETTER (OUTPATIENT)
Age: 52
End: 2022-10-15

## 2022-11-01 DIAGNOSIS — L71.9 ROSACEA: ICD-10-CM

## 2022-11-01 RX ORDER — IVERMECTIN 10 MG/G
CREAM TOPICAL
Qty: 45 G | Refills: 3 | Status: SHIPPED | OUTPATIENT
Start: 2022-11-01

## 2022-11-01 NOTE — TELEPHONE ENCOUNTER
Requested Prescriptions   Pending Prescriptions Disp Refills     ivermectin (SOOLANTRA) 1 % cream 45 g 3     Sig: Apply to the affected areas of the face once daily.       There is no refill protocol information for this order        Last Written Prescription Date:  10/26/2021  Last Fill Quantity: 45g,  # refills: 3   Last office visit: 7/29/2022 with prescribing provider:  Nicci Rodriguez    Future Office Visit:        Thank you  Riana BADILLO

## 2022-11-01 NOTE — TELEPHONE ENCOUNTER
Routing to provider for approval/denial since med not on [facility] protocol.    Liane CONNOR RN  Dermatology   [phone]

## 2022-11-02 ENCOUNTER — OFFICE VISIT (OUTPATIENT)
Dept: PODIATRY | Facility: CLINIC | Age: 52
End: 2022-11-02
Payer: COMMERCIAL

## 2022-11-02 ENCOUNTER — ANCILLARY PROCEDURE (OUTPATIENT)
Dept: GENERAL RADIOLOGY | Facility: CLINIC | Age: 52
End: 2022-11-02
Attending: PODIATRIST
Payer: COMMERCIAL

## 2022-11-02 VITALS
SYSTOLIC BLOOD PRESSURE: 118 MMHG | DIASTOLIC BLOOD PRESSURE: 78 MMHG | WEIGHT: 183 LBS | BODY MASS INDEX: 32.43 KG/M2 | HEIGHT: 63 IN

## 2022-11-02 DIAGNOSIS — M79.672 LEFT FOOT PAIN: ICD-10-CM

## 2022-11-02 DIAGNOSIS — M20.12 HAV (HALLUX ABDUCTO VALGUS), LEFT: ICD-10-CM

## 2022-11-02 DIAGNOSIS — M79.672 LEFT FOOT PAIN: Primary | ICD-10-CM

## 2022-11-02 DIAGNOSIS — M77.8 CAPSULITIS OF FOOT, LEFT: ICD-10-CM

## 2022-11-02 PROCEDURE — 99203 OFFICE O/P NEW LOW 30 MIN: CPT | Performed by: PODIATRIST

## 2022-11-02 PROCEDURE — 73630 X-RAY EXAM OF FOOT: CPT | Mod: TC | Performed by: RADIOLOGY

## 2022-11-02 NOTE — PATIENT INSTRUCTIONS
Thank you for choosing [facility] / Foot & Ankle Surgery!    DR YODER'S CLINIC:  [facility]   [address]   [place] [address]      TRIAGE LINE: [phone]  APPOINTMENTS: [phone]  RADIOLOGY: [phone]  SET UP SURGERY: [phone]  FAX NUMBER: [phone]  BILLING QUESTIONS: [phone]       Follow up: As needed      New Balance inserts with metatarsal pad    CAPSULITIS / METATARSALGIA  All joints in the body are surrounded by a capsule, or a covering of soft tissue and ligaments. The capsule holds bones together and secretes joint fluid to help lubricate the joint. If a joint capsule is exposed to excessive force, it can develop microscopic tears and become inflamed. This commonly occurs in the foot due to mild variation in anatomy. Hammertoes, bunions, irregular bone length, joint immobility, etc. can all lead to excessive force on the joint. Capsule injury can also occur due to repetitive stress from exercise, insufficient support from shoes, excessive bare foot walking and excessive weight.      Conservative treatments include ice, rest from the aggravating activity, weight loss, orthotic inserts, improving shoes and shoe modifications. Appropriate shoes will protect the inflamed tissue improving the chances of healing. Avoidance of standing or walking barefoot, including around the house, is necessary to allow healing. Casts are sometimes used for more aggressive protection.  NSAIDs such as Advil are also used to help with pain and decreasing inflammation. If pain continues over a period of weeks with continuous rest and icing, Corticosteroid injections can be a treatment option to try and help decrease inflammation.    Surgery is often necessary to correct the underlying structural problem. Surgery might include shortening an excessively long bone, repairing bunion or hammertoe, lengthening a tight Achilles  tendon, etc. These are same day surgeries  that might be pursued if more conservative measures fail to provide relief.      The inflamed joint capsule has the potential to completely tear. This will allow the toe to drift off the ground, curving toward the other toes. The involved toe may under or overlap the adjacent toes as drift continues. The pain may improve after the joint tears or this new position will be permanent. Surgery can address the toe alignment. Your goal of treating capsulitis is to avoid this scenario.       BUNION (HALLUX ABDUCTO VALGUS)  A bunion is caused by muscle imbalance. The great toe is pulled toward the smaller toes. The metatarsal head is pushed outward creating a lump on the side of your foot. Imbalance is the result of foot structure and instability.   Bunions do not improve with time. They usually enlarge, however this is a fairly slow process. Shoes do not necessarily cause bunions, however, they can hasten development and definitely cause bunions to hurt.   Bunions often run in families. We inherit a certain foot structure, which may be predisposed to bunion development.   Bunion pain is likely a combination of shoes rubbing on the bump, nerve irritation, compression between the toes, joint misalignment, arthritis and altered gait.   SYMPTOMS   Bunions are usually termed mild, moderate or severe. Just because you have a bunion does not mean you have to have pain. There are some people with very severe bunions and no pain and people with mild bunions and a lot of pain.   - Pain on the inside of your foot at the big toe joint (1st MTPJ)   - Swelling on the inside of your foot at the big toe joint   - Redness on the inside of your foot at the big toe joint   - Numbness or burning in the big toe (hallux)   - Decreased motion at the big toe joint   - Painful bursa (fluid-filled sac) on the inside of your foot at the big toe joint   - Pain while wearing shoes -especially shoes too narrow or with high heels    - Pain during  activities   - Corn in between the big toe and second toe   - Callous formation on the side or bottom of the big toe or big toe joint   - Callous under the second toe joint (2nd MTPJ)   - Pain in the second toe joint   TREATMENT  Conservative (non-surgical) treatment will not make the bunion go away, but it will hopefully decrease the signs and symptoms you have and help you get rid of the pain and get you back to your activities.   1.  Wider shoes or extra depth shoes: Most bunion pain can be improved simply by wearing compatible shoes. People with bunions cannot choose footwear simply because they like the style. Your bunion should determine which shoes are to be worn. Wide shoes with nonirritating seams,soft leather and a square toe box are most compatible with a bunion. Shoes should fit appropriately right out of the box but may need to be professionally stretched and modified to accommodate the bump. Heels, dress shoes and shoes with pointed toes will not be comfortable.   2. NSAIDs   3.  Arch supports, custom inserts, padding, splints, toe spacers : Most bunion pain can be improved simply by wearing compatible shoes. People with bunions cannot choose footwear simply because they like the style. Your bunion should determine which shoes are to be worn. Wide shoes with nonirritating seams,soft leather and a square toe box are most compatible with a bunion. Shoes should fit appropriately right out of the box but may need to be professionally stretched and modified to accommodate the bump. Heels, dress shoes and shoes with pointed toes will not be comfortable.   4.  Change activities   5.  Physical therapy  SURGERY  Surgical treatment for bunions is sometimes needed. If you are limited by pain, cannot fit in shoes comfortably and are not able to do your daily activities then surgery may be a good option for you. There are many different surgical procedures to repair bunions. Your foot and ankle surgeon will review  your foot exam findings, your x-rays, your age, your health, your lifestyle, your physical activity level and discuss with you which procedure he or she would recommend. Surgical procedures for bunions range from soft tissue repair to cutting and realigning the bones. It is not recommended that you have bunion surgery for cosmetic reasons (you do not like how your foot looks) or because you want to fit in a certain pair of shoes; There is the risk that even after surgery, the bunion will reoccur 9-10% of the time.   Bunion surgery involves cutting and repositioning the bones surrounding the bunion. Pins and screws are used to hold the bones in place during the healing process. The goal of bunion surgery is to reduce the size of the bunion bump. Realignment of the toe and joint is attempted.     Some first toes cannot be forced back into normal alignment even with surgery. Surgery is helpful in most cases but does not necessarily create a normal foot.   Healing after surgery requires about six weeks of protection. This allows the bone to heal. Maximum recovery takes about one year. The scar tissue and jOint structures require this amount of time to finish the healing process. Expect stiffness, swelling and numbness during that time frame. Bunion surgery does involve side effects. Some side effects are predictable and others are less common but do occur. A scar will be visible and could be irritated by shoes. The shoe may rub on the screw or internal pin requiring surgical removal of these fixation devices. The screw and pin would likely be left in place for a full year. The first toe may loose motion after bunion surgery. The amount of stiffness is variable. Some people never regain normal motion of the first toe. This is due to scar tissue inherent to any surgery. The first toe may drift toward the second toe or away from the second toe. Spreading of the first and second toes is a rare occurrence after bunion  surgery. This can be quite bothersome and would need to be surgically repaired. Toe drift toward the second toe could result in a recurrent bunion and revision surgery. Joint fusion is one option to correct an unstable, drifting toe. This procedure straightens the toe, however, no motion remains. Fusion may be necessary to correct complications of bunion surgery or as the original procedure in severe cases.   All surgical procedures involve risk of infection, numbness, pain, delayed healing, osteotomy dislocation, blood clots, continued foot pain, etc. Bunion surgery is quite complex and should not be taken lightly.   Any skin incision can lead to infection. Deep infection might involve the bone and thus repeat surgery and six weeks of IV antibiotics. Scar tissue can cause nerve pain or numbness. This is generally temporary but can be permanent. We do not have treatments that cure nerve problems. Second toe pain could be related to altered mechanics and pressure transferred to the second toe. Most feet with bunions have pre-existing second toe problems. Delayed bone healing would lengthen the healing time. Some bones simply do not heal. This requires repeat surgery, electronic bone stimulation and/or extended protection. Smokers have an approximate 20% chance of poor bone healing. This is double that of a non-smoker. The bone cut may displace. This may need to be repaired with a second operation. Displacement can cause jOint malalignment. Immobility after surgery can cause blood clots in the legs and lungs. This could result in death.   Foot pain is complex. Most feet hurt for more than one reason. Fixing the bunion would not necessarily create a pain free foot. Appropriate shoes, healthy body weight, avoidance of bare foot walking and moderation of activity will always be necessary to enjoy foot comfort. Your bunion may involve arthritis, which is incurable even with surgery. Long standing bunions often involve  chronic irritation to the surrounding nerves. Nerve pain may not resolve even with reducing the bunion bump since permanent nerve damage may be present   Bunion surgery is nevertheless quite successful. Most surgical patients are pleased with their foot following bunion surgery. Many of the issues described above can be controlled by taking proper care of your foot during the healing process.   Your surgeon would be happy to fully describe any of the above issues. You should pursue a full understanding of the operation,recovery process and any potential problems that could develop.   PREVENTION  1.  Do not wear high heels if there is a family history of bunions.  2.  Wear shoes that have enough width and depth in the toe box  Here are exercises that may benefit people with bunions:   Toe stretches - Stretching out your toes can help keep them limber and offset foot pain. To stretch your toes, point your toes straight ahead for 5 seconds and then curl them under for 5 seconds. Repeat these stretches 10 times. These exercises can be especially beneficial if you also have hammertoes, or chronically bent toes, in addition to a bunion.   Toe flexing and sonal - Press your toes against a hard surface such as a wall, to flex and stretch them; hold the position for 10 seconds and repeat three to four times. Then flex your toes in the opposite direction; hold the position for 10 seconds and repeat three to four times.   Stretching your big toe - Using your fingers to gently pull your big toe over into proper alignment can be helpful as well. Hold your toe in position for 10 seconds and repeat three to four times.   Resistance exercises - Wrap either a towel or belt around your big toe and use it to pull your big toe toward you while simultaneously pushing forward, against the towel, with your big toe.   Ball roll - To massage the bottom of your foot, sit down, place a golf ball on the floor under your foot, and roll it  around under your foot for two minutes. This can help relieve foot strain and cramping.   Towel curls - You can strengthen your toes by spreading out a small towel on the floor, curling your toes around it, and pulling it toward you. Repeat five times. Gripping objects with your toes like this can help keep your foot flexible.   Picking up marbles - Another gripping exercise you can perform to keep your foot flexible is picking up marbles with your toes. Do this by placing 20 marbles on the floor in front of you and use your foot to pick the marbles up one by one and place them in a bowl.   Walking along the beach - Whenever possible, spend time walking on sand. This can give you a gentle foot massage and also help strengthen your toes. This is especially beneficial for people who have arthritis associated with their bunions.      [facility]  [facility]  [address]  [place] [address]  Phone: [phone]  Fax: [phone] [facility] [employer] [facility]  [address]  [place] [address]  Phone: [phone]   Fax: [phone]   [facility]  [address]  [place] [address]  Phone: [phone]  Fax: [phone] [facility]  [address]  [place] [address]  Phone: [phone]   Fax: [phone]   [facility]   [address]  [place] [address]  Phone: [phone]  Fax: [phone] * Please call any location listed to make an appointment for a casting/fitting. Your referral was sent to their central office and they will all have the order on file.

## 2022-11-02 NOTE — PROGRESS NOTES
PATIENT HISTORY:  Marielos Bowden is a 52 year old female who presents to clinic for pain to the left foot.  Has been going on for a few months.  She does she is been trying to do more activity this summer and increase her exercise.  She states it started after that.  Denies specific injury.  Pain can be tingling burning shooting pain.  Worse after increased activity.  Has rested last few days and notes her foot is feeling a little better.  Wondering what is causing the pain and what can be done for it.    Review of Systems:  Patient denies fever, chills, rash, wound, stiffness,  numbness, weakness, heart burn, blood in stool, chest pain with activity, calf pain when walking, shortness of breath with activity, chronic cough, easy bleeding/bruising, swelling of ankles, excessive thirst, fatigue, depression, anxiety.  Patient admits to limping at times.     PAST MEDICAL HISTORY:   Past Medical History:   Diagnosis Date     Basal cell carcinoma      Hormone replacement therapy      Malignant neoplasm (H)     Hx basal cell Ca     Simple or unspecified chronic serous otitis media as a child        PAST SURGICAL HISTORY:   Past Surgical History:   Procedure Laterality Date     [facility] NONSPECIFIC PROCEDURE      PE tubes's x 12 secondary to ear infections     [facility] NONSPECIFIC PROCEDURE  1994    L  TM reconstruction (2nd time)     [facility] NONSPECIFIC PROCEDURE  child     s/p tonsillectomy & adenoidectomy     [facility] NONSPECIFIC PROCEDURE  1996, 1998    2 NSVDs        MEDICATIONS:   Current Outpatient Medications:      estradiol (ESTRACE) 2 MG tablet, Take 1 tablet (2 mg) by mouth daily, Disp: 90 tablet, Rfl: 3     fish oil-omega-3 fatty acids 1000 MG capsule, Take 3 g by mouth daily , Disp: , Rfl:      glucosamine-chondroitin 500-400 MG CAPS per capsule, Take 1 capsule by mouth daily, Disp: , Rfl:      guaiFENesin (MUCINEX) 600 MG 12 hr tablet, Take 600 mg by mouth daily, Disp: , Rfl:      ivermectin (SOOLANTRA) 1 % cream, Apply to the  affected areas of the face once daily., Disp: 45 g, Rfl: 3     loratadine-pseudoePHEDrine (CLARITIN-D 24-HOUR)  MG per tablet, Take 1 tablet by mouth daily, Disp: , Rfl:      progesterone (PROMETRIUM) 200 MG capsule, Take 1 capsule (200 mg) by mouth daily, Disp: 90 capsule, Rfl: 3     Vitamin D, Cholecalciferol, 25 MCG (1000 UT) TABS, , Disp: , Rfl:      ALLERGIES:    Allergies   Allergen Reactions     No Known Drug Allergies         SOCIAL HISTORY:   Social History     Socioeconomic History     Marital status:      Spouse name: Gilberto     Number of children: 2     Years of education: Not on file     Highest education level: Not on file   Occupational History     Occupation: listing and      Employer: [employer]     Occupation:      Comment: [employer]   Tobacco Use     Smoking status: Never     Smokeless tobacco: Never   Substance and Sexual Activity     Alcohol use: No     Drug use: No     Sexual activity: Yes     Partners: Male     Birth control/protection: Surgical, Male Surgical     Comment: vasectomy in    Other Topics Concern     Parent/sibling w/ CABG, MI or angioplasty before 65F 55M? Not Asked   Social History Narrative     Not on file     Social Determinants of Health     Financial Resource Strain: Not on file   Food Insecurity: Not on file   Transportation Needs: Not on file   Physical Activity: Not on file   Stress: Not on file   Social Connections: Not on file   Intimate Partner Violence: Not on file   Housing Stability: Not on file        FAMILY HISTORY:   Family History   Problem Relation Age of Onset     Hypertension Mother      Lung Cancer Mother         2016     Lipids Father      Hypertension Father      Basal cell carcinoma Father      Heart Disease Daughter 15        [facility] with stent     Cancer - colorectal Maternal Grandmother 80        recently       Gynecology Maternal Grandmother      Cancer Maternal Grandmother      "    cervical cancer     Diabetes Maternal Grandmother         great grandmother     Cancer - colorectal Maternal Aunt         great aunt     C.A.D. Paternal Grandfather         CABG     Skin Cancer Maternal Aunt      No Known Problems Sister      No Known Problems Brother      No Known Problems Maternal Grandfather      Basal cell carcinoma Paternal Grandmother      No Known Problems Other      Breast Cancer No family hx of         EXAM:Vitals: Ht 1.6 m (5' 3\")   Wt 83 kg (183 lb)   BMI 32.42 kg/m    BMI= Body mass index is 32.42 kg/m .    General appearance: Patient is alert and fully cooperative with history & exam.  No sign of distress is noted during the visit.     Psychiatric: Affect is pleasant & appropriate.  Patient appears motivated to improve health.     Respiratory: Breathing is regular & unlabored while sitting.     HEENT: Hearing is intact to spoken word.  Speech is clear.  No gross evidence of visual impairment that would impact ambulation.     Dermatologic: Skin is intact to both lower extremities without significant lesions, rash or abrasion.  No paronychia or evidence of soft tissue infection is noted.     Vascular: DP & PT pulses are intact & regular bilaterally.  No significant edema or varicosities noted.  CFT and skin temperature is normal to both lower extremities.     Neurologic: Lower extremity sensation is intact to light touch.  No evidence of weakness or contracture in the lower extremities.  No evidence of neuropathy.     Musculoskeletal: Patient is ambulatory without assistive device or brace.  Prominent first metatarsal head medially left foot pain on palpation of the second metatarsal phalangeal joint with range of motion of the second toe.    Radiographs: left foot xray-  I personally reviewed the xrays -decreased calcaneal inclination angle.  Increase in first and second intermetatarsal angle with tibial sesamoid position of 4.  Slightly elongated second metatarsal.  No fractures " are noted.  Accessory navicular noted.  Plantar heel spur.     ASSESSMENT:    Left foot pain  Hav (hallux abducto valgus), left  Capsulitis of foot, left     Medical Decision Making/Plan:  Reviewed patient's chart in [facility].  Reviewed x-rays with patient.  Reviewed and discussed causes of capsulitis.  Talked about how the elongated 2nd metatarsal can cause more pressure to occur to the joint.  We talked about treatments such as padding, orthotics, injection, physical therapy, immobilization, MRI, and possible surgery to shorten metatarsal.    Reviewed and discussed causes of hammertoes with patient.  Explained that this can be caused by an overpowering of muscles or by the way we walk.  Discussed conservative treatments such as orthotics, pads, shoe gear.  Explained that sometimes the flexor tendons can be cut to try and straighten the toe and reduce rubbing. This is normally done in office and patient is weight bearing in postop she for 1-2 weeks.  We also discussed surgical intervention to remove the joint and possibly fuse the toe.  Normally patient has a pin sticking out of the toe for about 6 weeks and can not get the foot wet. Patient would have to be minimal weight bearing in cam boot.      She is not interested in surgery.  She is going to try inserts with metatarsal pad.  Was given an order for custom inserts.  She will also try topical pain cream and NSAIDs.  Talked about different shoes that she can wear as well that have more of a metatarsal pad cushion.  Pain continues could try a boot to help offload area.  All questions were answered to patient's satisfaction she will call for the questions or concerns.    Patient risk factor: Patient is at low risk for infection.        Dorinda Nur DPM, Podiatry/Foot and Ankle Surgery

## 2022-11-02 NOTE — LETTER
11/2/2022         RE: Marielos Bowden  [address] [facility] [id number]        Dear Colleague,    Thank you for referring your patient, Marielos Bowden, to the [facility]. Please see a copy of my visit note below.    PATIENT HISTORY:  Marielos Bowden is a 52 year old female who presents to clinic for pain to the left foot.  Has been going on for a few months.  She does she is been trying to do more activity this summer and increase her exercise.  She states it started after that.  Denies specific injury.  Pain can be tingling burning shooting pain.  Worse after increased activity.  Has rested last few days and notes her foot is feeling a little better.  Wondering what is causing the pain and what can be done for it.    Review of Systems:  Patient denies fever, chills, rash, wound, stiffness,  numbness, weakness, heart burn, blood in stool, chest pain with activity, calf pain when walking, shortness of breath with activity, chronic cough, easy bleeding/bruising, swelling of ankles, excessive thirst, fatigue, depression, anxiety.  Patient admits to limping at times.     PAST MEDICAL HISTORY:   Past Medical History:   Diagnosis Date     Basal cell carcinoma      Hormone replacement therapy      Malignant neoplasm (H)     Hx basal cell Ca     Simple or unspecified chronic serous otitis media as a child        PAST SURGICAL HISTORY:   Past Surgical History:   Procedure Laterality Date     [facility] NONSPECIFIC PROCEDURE      PE tubes's x 12 secondary to ear infections     [facility] NONSPECIFIC PROCEDURE  1994    L  TM reconstruction (2nd time)     [facility] NONSPECIFIC PROCEDURE  child     s/p tonsillectomy & adenoidectomy     [facility] NONSPECIFIC PROCEDURE  1996, 1998    2 NSVDs        MEDICATIONS:   Current Outpatient Medications:      estradiol (ESTRACE) 2 MG tablet, Take 1 tablet (2 mg) by mouth daily, Disp: 90 tablet, Rfl: 3     fish oil-omega-3 fatty acids 1000 MG capsule, Take 3 g by mouth daily ,  Disp: , Rfl:      glucosamine-chondroitin 500-400 MG CAPS per capsule, Take 1 capsule by mouth daily, Disp: , Rfl:      guaiFENesin (MUCINEX) 600 MG 12 hr tablet, Take 600 mg by mouth daily, Disp: , Rfl:      ivermectin (SOOLANTRA) 1 % cream, Apply to the affected areas of the face once daily., Disp: 45 g, Rfl: 3     loratadine-pseudoePHEDrine (CLARITIN-D 24-HOUR)  MG per tablet, Take 1 tablet by mouth daily, Disp: , Rfl:      progesterone (PROMETRIUM) 200 MG capsule, Take 1 capsule (200 mg) by mouth daily, Disp: 90 capsule, Rfl: 3     Vitamin D, Cholecalciferol, 25 MCG (1000 UT) TABS, , Disp: , Rfl:      ALLERGIES:    Allergies   Allergen Reactions     No Known Drug Allergies         SOCIAL HISTORY:   Social History     Socioeconomic History     Marital status:      Spouse name: Gilberto     Number of children: 2     Years of education: Not on file     Highest education level: Not on file   Occupational History     Occupation: listing and      Employer: [employer]     Occupation:      Comment: [employer]   Tobacco Use     Smoking status: Never     Smokeless tobacco: Never   Substance and Sexual Activity     Alcohol use: No     Drug use: No     Sexual activity: Yes     Partners: Male     Birth control/protection: Surgical, Male Surgical     Comment: vasectomy in    Other Topics Concern     Parent/sibling w/ CABG, MI or angioplasty before 65F 55M? Not Asked   Social History Narrative     Not on file     Social Determinants of Health     Financial Resource Strain: Not on file   Food Insecurity: Not on file   Transportation Needs: Not on file   Physical Activity: Not on file   Stress: Not on file   Social Connections: Not on file   Intimate Partner Violence: Not on file   Housing Stability: Not on file        FAMILY HISTORY:   Family History   Problem Relation Age of Onset     Hypertension Mother      Lung Cancer Mother         2016     Lipids Father       "Hypertension Father      Basal cell carcinoma Father      Heart Disease Daughter 15        ackerman parkinson with stent     Cancer - colorectal Maternal Grandmother 80        recently       Gynecology Maternal Grandmother      Cancer Maternal Grandmother         cervical cancer     Diabetes Maternal Grandmother         great grandmother     Cancer - colorectal Maternal Aunt         great aunt     BRENDENACHERRIE. Paternal Grandfather         CABG     Skin Cancer Maternal Aunt      No Known Problems Sister      No Known Problems Brother      No Known Problems Maternal Grandfather      Basal cell carcinoma Paternal Grandmother      No Known Problems Other      Breast Cancer No family hx of         EXAM:Vitals: Ht 1.6 m (5' 3\")   Wt 83 kg (183 lb)   BMI 32.42 kg/m    BMI= Body mass index is 32.42 kg/m .    General appearance: Patient is alert and fully cooperative with history & exam.  No sign of distress is noted during the visit.     Psychiatric: Affect is pleasant & appropriate.  Patient appears motivated to improve health.     Respiratory: Breathing is regular & unlabored while sitting.     HEENT: Hearing is intact to spoken word.  Speech is clear.  No gross evidence of visual impairment that would impact ambulation.     Dermatologic: Skin is intact to both lower extremities without significant lesions, rash or abrasion.  No paronychia or evidence of soft tissue infection is noted.     Vascular: DP & PT pulses are intact & regular bilaterally.  No significant edema or varicosities noted.  CFT and skin temperature is normal to both lower extremities.     Neurologic: Lower extremity sensation is intact to light touch.  No evidence of weakness or contracture in the lower extremities.  No evidence of neuropathy.     Musculoskeletal: Patient is ambulatory without assistive device or brace.  Prominent first metatarsal head medially left foot pain on palpation of the second metatarsal phalangeal joint with range of motion of the " second toe.    Radiographs: left foot xray-  I personally reviewed the xrays -decreased calcaneal inclination angle.  Increase in first and second intermetatarsal angle with tibial sesamoid position of 4.  Slightly elongated second metatarsal.  No fractures are noted.  Accessory navicular noted.  Plantar heel spur.     ASSESSMENT:    Left foot pain  Hav (hallux abducto valgus), left  Capsulitis of foot, left     Medical Decision Making/Plan:  Review[facility] in [facility].  Reviewed x-rays with patient.  Reviewed and discussed causes of capsulitis.  Talked about how the elongated 2nd metatarsal can cause more pressure to occur to the joint.  We talked about treatments such as padding, orthotics, injection, physical therapy, immobilization, MRI, and possible surgery to shorten metatarsal.    Reviewed and discussed causes of hammertoes with patient.  Explained that this can be caused by an overpowering of muscles or by the way we walk.  Discussed conservative treatments such as orthotics, pads, shoe gear.  Explained that sometimes the flexor tendons can be cut to try and straighten the toe and reduce rubbing. This is normally done in office and patient is weight bearing in postop she for 1-2 weeks.  We also discussed surgical intervention to remove the joint and possibly fuse the toe.  Normally patient has a pin sticking out of the toe for about 6 weeks and can not get the foot wet. Patient would have to be minimal weight bearing in cam boot.      She is not interested in surgery.  She is going to try inserts with metatarsal pad.  Was given an order for custom inserts.  She will also try topical pain cream and NSAIDs.  Talked about different shoes that she can wear as well that have more of a metatarsal pad cushion.  Pain continues could try a boot to help offload area.  All questions were answered to patient's satisfaction she will call for the questions or concerns.    Patient risk factor: Patient is at low risk  for infection.        Dorinda Nur DPM, Podiatry/Foot and Ankle Surgery        Again, thank you for allowing me to participate in the care of your patient.        Sincerely,        Dorinda Nur DPM, Podiatry/Foot and Ankle Surgery

## 2022-12-20 ENCOUNTER — HOSPITAL ENCOUNTER (OUTPATIENT)
Dept: MAMMOGRAPHY | Facility: CLINIC | Age: 52
Discharge: HOME | End: 2022-12-20
Attending: OBSTETRICS & GYNECOLOGY | Admitting: OBSTETRICS & GYNECOLOGY
Payer: COMMERCIAL

## 2022-12-20 DIAGNOSIS — Z12.31 VISIT FOR SCREENING MAMMOGRAM: ICD-10-CM

## 2022-12-20 PROCEDURE — 77067 SCR MAMMO BI INCL CAD: CPT

## 2022-12-29 DIAGNOSIS — Z79.890 HORMONE REPLACEMENT THERAPY: ICD-10-CM

## 2022-12-29 DIAGNOSIS — N95.1 SYMPTOMATIC MENOPAUSAL OR FEMALE CLIMACTERIC STATES: ICD-10-CM

## 2022-12-29 RX ORDER — ESTRADIOL 2 MG/1
2 TABLET ORAL DAILY
Qty: 90 TABLET | Refills: 0 | Status: SHIPPED | OUTPATIENT
Start: 2022-12-29 | End: 2023-01-10

## 2022-12-29 RX ORDER — PROGESTERONE 200 MG/1
200 CAPSULE ORAL DAILY
Qty: 90 CAPSULE | Refills: 0 | Status: SHIPPED | OUTPATIENT
Start: 2022-12-29 | End: 2023-01-10

## 2022-12-29 NOTE — TELEPHONE ENCOUNTER
"Requested Prescriptions   Pending Prescriptions Disp Refills     progesterone (PROMETRIUM) 200 MG capsule [Pharmacy Med Name: Progesterone Oral Capsule 200 MG] 90 capsule 0     Sig: Take 1 capsule (200 mg) by mouth daily       Hormone Replacement Therapy Passed - 12/29/2022  2:00 AM        Passed - Blood pressure under 140/90 in past 12 months     BP Readings from Last 3 Encounters:   11/02/22 118/78   12/16/21 110/80   10/26/21 124/86                 Passed - Recent (12 mo) or future (30 days) visit within the authorizing provider's specialty     Patient has had an office visit with the authorizing provider or a provider within the authorizing providers department within the previous 12 mos or has a future within next 30 days. See \"Patient Info\" tab in inbasket, or \"Choose Columns\" in Meds & Orders section of the refill encounter.              Passed - Patient has mammogram in past 2 years on file if age 50-75        Passed - Medication is active on med list        Passed - Patient is 18 years of age or older        Passed - No active pregnancy on record        Passed - No positive pregnancy test on record in past 12 months           estradiol (ESTRACE) 2 MG tablet [Pharmacy Med Name: Estradiol Oral Tablet 2 MG] 90 tablet 0     Sig: Take 1 tablet (2 mg) by mouth daily       Hormone Replacement Therapy Passed - 12/29/2022  2:00 AM        Passed - Blood pressure under 140/90 in past 12 months     BP Readings from Last 3 Encounters:   11/02/22 118/78   12/16/21 110/80   10/26/21 124/86                 Passed - Recent (12 mo) or future (30 days) visit within the authorizing provider's specialty     Patient has had an office visit with the authorizing provider or a provider within the authorizing providers department within the previous 12 mos or has a future within next 30 days. See \"Patient Info\" tab in inScoreoidsket, or \"Choose Columns\" in Meds & Orders section of the refill encounter.              Passed - Patient has " mammogram in past 2 years on file if age 50-75        Passed - Medication is active on med list        Passed - Patient is 18 years of age or older        Passed - No active pregnancy on record        Passed - No positive pregnancy test on record in past 12 months           Next 5 appointments (look out 90 days)    Dakota 10, 2023  3:50 PM  PHYSICAL with Loretta Michel MD  [facility] ([facility] ) [address] [place] [id number]  [phone]        Prescription approved per [facility] Refill Protocol.  Lindsey Feliciano RN on 12/29/2022 at 9:00 AM

## 2023-01-09 NOTE — PROGRESS NOTES
Marielos is a 52 year old  female who presents for annual exam.     Besides routine health maintenance,  she would like to discuss hormones.    HPI:   Here today for yearly exam --doing fairly well.  Postmenopausal.  Has now had no bleeding since prior to our last visit together in Dec 2021.  Still occ hot flushes and night sweats --even on 2mg estradiol daily but definitely better than before.  Hoping to start the weaning process.  +SA --more dryness and discomfort.   hesitant to use lubricant but does help.  No bowel/bladder issues.  Denies leaking or incontinence issues    ; works in [employer] ([employer] home); 2 daughters  -staying active with walking, hiking in summer months, light strength exercises at home; questions recommended activity levels and dietary recommendations  +mammo last month -normal; +SBE --no issues  No PCP --will return for fasting bloodwork at her convenience; will also check on TDap and have that with lab if not already done  -up to date on colonoscopy --will be due next year (5yrs)  -has had covid and flu vaccines      GYNECOLOGIC HISTORY:    Patient's last menstrual period was 2021 (exact date).    Her current contraception method is: vasectomy & postmeno.  She  reports that she has never smoked. She has never used smokeless tobacco.    Patient is sexually active.  STD testing offered?  Declined  Last PHQ-9 score on record =   PHQ-9 SCORE 1/10/2023   PHQ-9 Total Score 0     Last GAD7 score on record =   DIANE-7 SCORE 1/10/2023   Total Score 0     Alcohol Score = 0    HEALTH MAINTENANCE:  Cholesterol: 2018   Total= 274, Triglycerides=87, HDL=80, MEP=575, FBS=93, TSH (2020)=2.11  Last Mammo: 2022, Result: Normal, Next Mammo: 2023   Pap: (  Lab Results   Component Value Date    GYNINTERP  2021     Negative for Intraepithelial Lesion or Malignancy (NILM)    PAP NIL 2014    PAP NIL 2009    )  HPV  Neg  Colonoscopy:  2019, Result: polyps, Next Colonoscopy: 1 years.  Dexa:  NA    Health maintenance updated:  Tdap due    HISTORY:  OB History    Para Term  AB Living   2 2 2 0 0 2   SAB IAB Ectopic Multiple Live Births   0 0 0 0 2      # Outcome Date GA Lbr Axel/2nd Weight Sex Delivery Anes PTL Lv   2 Term 1937    F    JIN   1 Term  38w0d   F    JIN       Patient Active Problem List   Diagnosis     Family history of colon cancer     CARDIOVASCULAR SCREENING; LDL GOAL LESS THAN 160     History of basal cell carcinoma     Hormone replacement therapy     Past Surgical History:   Procedure Laterality Date     [facility] NONSPECIFIC PROCEDURE      PE tubes's x 12 secondary to ear infections     [facility] NONSPECIFIC PROCEDURE      L  TM reconstruction (2nd time)     [facility] NONSPECIFIC PROCEDURE  child     s/p tonsillectomy & adenoidectomy     [facility] NONSPECIFIC PROCEDURE  ,     2 NSVDs      Social History     Tobacco Use     Smoking status: Never     Smokeless tobacco: Never   Substance Use Topics     Alcohol use: No      Problem (# of Occurrences) Relation (Name,Age of Onset)    Cancer (1) Maternal Grandmother: cervical cancer    Diabetes (1) Maternal Grandmother: great grandmother    Gynecology (1) Maternal Grandmother    Heart Disease (1) Daughter (15): ackerman parkinson with stent    Hypertension (2) Mother, Father    Lipids (1) Father    Cancer - colorectal (2) Maternal Grandmother (80): recently  , Maternal Aunt: great aunt    C.A.D. (1) Paternal Grandfather: CABG    Basal cell carcinoma (2) Father, Paternal Grandmother    Lung Cancer (1) Mother: 2016    Skin Cancer (1) Maternal Aunt    No Known Problems (4) Sister, Brother, Maternal Grandfather, Other       Negative family history of: Breast Cancer            Current Outpatient Medications   Medication Sig     estradiol (ESTRACE) 2 MG tablet Take 1 tablet (2 mg) by mouth daily     glucosamine-chondroitin 500-400 MG CAPS per capsule Take 1 capsule  "by mouth daily     guaiFENesin (MUCINEX) 600 MG 12 hr tablet Take 600 mg by mouth daily     ivermectin (SOOLANTRA) 1 % cream Apply to the affected areas of the face once daily.     loratadine-pseudoePHEDrine (CLARITIN-D 24-HOUR)  MG per tablet Take 1 tablet by mouth daily     progesterone (PROMETRIUM) 200 MG capsule Take 1 capsule (200 mg) by mouth daily     vitamin B-12 (CYANOCOBALAMIN) 250 MCG tablet Take 250 mcg by mouth daily     Vitamin D, Cholecalciferol, 25 MCG (1000 UT) TABS      No current facility-administered medications for this visit.     Allergies   Allergen Reactions     No Known Drug Allergies        Past medical, surgical, social and family histories were reviewed and updated in EPIC.    ROS:   12 point review of systems negative other than symptoms noted below or in the HPI.  Genitourinary: Hot Flashes  No urinary frequency or dysuria, bladder or kidney problems    EXAM:  /68   Ht 1.607 m (5' 3.25\")   Wt 86.2 kg (190 lb)   LMP 01/20/2021 (Exact Date)   BMI 33.39 kg/m     BMI: Body mass index is 33.39 kg/m .    PHYSICAL EXAM:  Constitutional:   Appearance: Well nourished, well developed, alert, in no acute distress  Neck:  Lymph Nodes:  No lymphadenopathy present    Thyroid:  Gland size normal, nontender, no nodules or masses present  on palpation  Chest:  Respiratory Effort:  Breathing unlabored  Cardiovascular:    Heart: Auscultation:  Regular rate, normal rhythm, no murmurs present  Breasts: Palpation of Breasts and Axillae:  No masses present on palpation, no breast tenderness., Axillary Lymph Nodes:  No lymphadenopathy present. and No nodularity, asymmetry or nipple discharge bilaterally.  Gastrointestinal:   Abdominal Examination:  Abdomen nontender to palpation, tone normal without rigidity or guarding, no masses present, umbilicus without lesions   Liver and Spleen:  No hepatomegaly present, liver nontender to palpation    Hernias:  No hernias present  Lymphatic: Lymph Nodes: "  No other lymphadenopathy present  Skin:  General Inspection:  No rashes present, no lesions present, no areas of  discoloration  Neurologic:    Mental Status:  Oriented X3.  Normal strength and tone, sensory exam                grossly normal, mentation intact and speech normal.    Psychiatric:   Mentation appears normal and affect normal/bright.         Pelvic Exam:  External Genitalia:     Normal appearance for age, no discharge present, no tenderness present, no inflammatory lesions present, color normal  Vagina:     Normal vaginal vault without central or paravaginal defects, no discharge present, no inflammatory lesions present, no masses present  Bladder:     Nontender to palpation  Urethra:   Urethral Body:  Urethra palpation normal, urethra structural support normal   Urethral Meatus:  No erythema or lesions present  Cervix:     Appearance healthy, no lesions present, nontender to palpation, no bleeding present  Uterus:     Uterus: firm, normal sized and nontender, anteverted in position.   Adnexa:     No adnexal tenderness present, no adnexal masses present  Perineum:     Perineum within normal limits, no evidence of trauma, no rashes or skin lesions present  Anus:     Anus within normal limits, no hemorrhoids present  Inguinal Lymph Nodes:     No lymphadenopathy present  Pubic Hair:     Normal pubic hair distribution for age  Genitalia and Groin:     No rashes present, no lesions present, no areas of discoloration, no masses present      COUNSELING:   Reviewed preventive health counseling, as reflected in patient instructions  Special attention given to:        Regular exercise       Healthy diet/nutrition       Colorectal Cancer Screening       (Leonor)menopause management    BMI: Body mass index is 33.39 kg/m .  Weight management plan: Discussed healthy diet and exercise guidelines    ASSESSMENT:  52 year old female with satisfactory annual exam.    ICD-10-CM    1. Encounter for gynecological examination  without abnormal finding  Z01.419       2. Symptomatic menopausal or female climacteric states  N95.1 progesterone (PROMETRIUM) 200 MG capsule     estradiol (ESTRACE) 2 MG tablet      3. Hormone replacement therapy  Z79.890 progesterone (PROMETRIUM) 200 MG capsule     estradiol (ESTRACE) 2 MG tablet      4. Encounter for lipid screening for cardiovascular disease  Z13.220 Lipid panel reflex to direct LDL Fasting    Z13.6       5. Screening for diabetes mellitus  Z13.1 Glucose whole blood      6. Screening for thyroid disorder  Z13.29 TSH with free T4 reflex          PLAN:  Patient Instructions   Return to clinic for screening Lipids, thyroid and Fasting Blood sugar.  Follow up with your primary care provider for your other medical problems.  [facility] study was discussed in detail including current information.  The patient requests continuation of hormone therapy but agrees to begin the weaning process with alternating 2mg/1mg dosing over the next 6 months.  If no signficant worsening of symptoms in that time, will transition to 1mg estradiol daily at that time  Continue self breast exam.  Increase physical activity and exercise.  Lab results will be called to the patient.  Usual safety and preventative measures counseling done.  BMI >25  Weight loss encouraged.  Last pap smear (2021) was normal and negative for the DNA of high risk HPV subtypes.  No pap was obtained this year.  This was discussed with the patient and she agrees with the plan.       Loretta Michel MD

## 2023-01-10 ENCOUNTER — OFFICE VISIT (OUTPATIENT)
Dept: OBGYN | Facility: CLINIC | Age: 53
End: 2023-01-10
Payer: COMMERCIAL

## 2023-01-10 VITALS
WEIGHT: 190 LBS | DIASTOLIC BLOOD PRESSURE: 68 MMHG | SYSTOLIC BLOOD PRESSURE: 116 MMHG | BODY MASS INDEX: 33.66 KG/M2 | HEIGHT: 63 IN

## 2023-01-10 DIAGNOSIS — Z13.220 ENCOUNTER FOR LIPID SCREENING FOR CARDIOVASCULAR DISEASE: ICD-10-CM

## 2023-01-10 DIAGNOSIS — Z13.29 SCREENING FOR THYROID DISORDER: ICD-10-CM

## 2023-01-10 DIAGNOSIS — Z01.419 ENCOUNTER FOR GYNECOLOGICAL EXAMINATION WITHOUT ABNORMAL FINDING: Primary | ICD-10-CM

## 2023-01-10 DIAGNOSIS — N95.1 SYMPTOMATIC MENOPAUSAL OR FEMALE CLIMACTERIC STATES: ICD-10-CM

## 2023-01-10 DIAGNOSIS — Z13.1 SCREENING FOR DIABETES MELLITUS: ICD-10-CM

## 2023-01-10 DIAGNOSIS — Z13.6 ENCOUNTER FOR LIPID SCREENING FOR CARDIOVASCULAR DISEASE: ICD-10-CM

## 2023-01-10 DIAGNOSIS — Z79.890 HORMONE REPLACEMENT THERAPY: ICD-10-CM

## 2023-01-10 PROCEDURE — 99396 PREV VISIT EST AGE 40-64: CPT | Performed by: OBSTETRICS & GYNECOLOGY

## 2023-01-10 RX ORDER — ESTRADIOL 2 MG/1
2 TABLET ORAL DAILY
Qty: 90 TABLET | Refills: 3 | Status: SHIPPED | OUTPATIENT
Start: 2023-01-10 | End: 2024-01-24

## 2023-01-10 RX ORDER — PROGESTERONE 200 MG/1
200 CAPSULE ORAL DAILY
Qty: 90 CAPSULE | Refills: 3 | Status: SHIPPED | OUTPATIENT
Start: 2023-01-10 | End: 2024-01-24

## 2023-01-10 ASSESSMENT — ANXIETY QUESTIONNAIRES
GAD7 TOTAL SCORE: 0
3. WORRYING TOO MUCH ABOUT DIFFERENT THINGS: NOT AT ALL
5. BEING SO RESTLESS THAT IT IS HARD TO SIT STILL: NOT AT ALL
6. BECOMING EASILY ANNOYED OR IRRITABLE: NOT AT ALL
IF YOU CHECKED OFF ANY PROBLEMS ON THIS QUESTIONNAIRE, HOW DIFFICULT HAVE THESE PROBLEMS MADE IT FOR YOU TO DO YOUR WORK, TAKE CARE OF THINGS AT HOME, OR GET ALONG WITH OTHER PEOPLE: NOT DIFFICULT AT ALL
1. FEELING NERVOUS, ANXIOUS, OR ON EDGE: NOT AT ALL
2. NOT BEING ABLE TO STOP OR CONTROL WORRYING: NOT AT ALL
GAD7 TOTAL SCORE: 0
7. FEELING AFRAID AS IF SOMETHING AWFUL MIGHT HAPPEN: NOT AT ALL

## 2023-01-10 ASSESSMENT — PATIENT HEALTH QUESTIONNAIRE - PHQ9
5. POOR APPETITE OR OVEREATING: NOT AT ALL
SUM OF ALL RESPONSES TO PHQ QUESTIONS 1-9: 0

## 2023-01-10 NOTE — PATIENT INSTRUCTIONS
Return to clinic for screening Lipids, thyroid and Fasting Blood sugar.  Follow up with your primary care provider for your other medical problems.  [facility] study was discussed in detail including current information.  The patient requests continuation of hormone therapy but agrees to begin the weaning process with alternating 2mg/1mg dosing over the next 6 months.  If no signficant worsening of symptoms in that time, will transition to 1mg estradiol daily at that time  Continue self breast exam.  Increase physical activity and exercise.  Lab results will be called to the patient.  Usual safety and preventative measures counseling done.  BMI >25  Weight loss encouraged.  Last pap smear (2021) was normal and negative for the DNA of high risk HPV subtypes.  No pap was obtained this year.  This was discussed with the patient and she agrees with the plan.

## 2023-02-26 ENCOUNTER — MYC MEDICAL ADVICE (OUTPATIENT)
Dept: OBGYN | Facility: CLINIC | Age: 53
End: 2023-02-26
Payer: COMMERCIAL

## 2023-02-26 DIAGNOSIS — N95.0 POSTMENOPAUSAL BLEEDING: Primary | ICD-10-CM

## 2023-02-27 NOTE — TELEPHONE ENCOUNTER
1/10/23 annual w Dr Michel  Weaning of HRT discussed from estradiol 2mg to 1mg  Progesterone 200mg daily    Routing pt mychart message to provider to advise.  US and visit?    Karla De Jesus RN on 2/27/2023 at 2:32 PM

## 2023-02-27 NOTE — TELEPHONE ENCOUNTER
Should schedule pelvic ultrasound and visit.  Will likely need to be with one of my partners since I will be out for the next 10days.  If lining is irregular, may need an endometrial biopsy -especially since she is on such a high dose of estrogen.   Also, let's see if she can alternate 2mg and 1mg daily for the next 3 months and see how she does

## 2023-03-07 NOTE — PROGRESS NOTES
"[facility]  OB/GYN Clinic Note    SUBJECTIVE:                                                   Marielos Bowden is a 52 year old female who presents to clinic today for the following health issue(s):  Patient presents with:  Ultrasound: F/u to postmenopausal bleeding. Bled from 2/23 - 3/2. Has not had bleeding since Nov 21.  Additional information: Pt c/o abnormal bleeding.    HPI:  Patient presents to follow-up on US done for post-menopausal bleeding.   Had period like bleeding in February. No bleeding since then. Last true menses was Nov 2021. Recently changed to decreased dose of HRT. No use of blood thinners. Colon cancer runs in the family- maternal grandmother and great aunts and great uncle with colon cancer. Mother with lung cancer. No family hx of uterine, ovarian, breast cancer.       OBJECTIVE:     /74   Ht 1.607 m (5' 3.25\")   Wt 86.6 kg (191 lb)   LMP 01/20/2021 (Exact Date)   BMI 33.57 kg/m    Body mass index is 33.57 kg/m .    Exam:  Constitutional:  Appearance: Well nourished, well developed alert, in no acute distress     Imaging:  US Transvaginal Pelvic Non-OB 03/09/2023    Narrative  Table formatting from the original result was not included.  US Transvaginal Pelvic Non-OB  Order #: [id number] Accession #: [id number]  Study Notes    Spurling, Brittnee on 3/9/2023  3:37 PM    Gynecological Ultrasound Report  Pelvic U/S - Transvaginal  [facility]  Referring Provider: Loretta Michel MD  Sonographer:  Brittnee Spurling, RDMS  Indication: Bleeding/Menses- Postmenopausal bleeding  LMP: Patient's last menstrual period was 01/20/2021 (exact date).  History:  Gynecological Ultrasonography:  Uterus: anteverted. Contour is irregular w/ myomata: 1 Right Subserosal 7.0 x 6.0 x 6.3 cm and 2 Left Posterior 1.3 x 1.0 x 1.3 cm.  Size: 10.0 x 7.3 x 6.0 cm  Endometrium: Thickness Total 5.3 mm  Findings: uterine fibroids  Right Ovary:  Not visualized  Left Ovary: Not " visualized  Cul de Sac Free Fluid: No free fluid    Impression:  The endometrium appeared thickened at 5.3mm.  The uterus was enlarged and multiple fibroids were seen, largest is right subserosal, 7.0cm.  The ovaries could not be seen on today's study.  Recommend clinical correlation and endometrial sampling if indicated.    Astrid Schmidt MD, S  23        ASSESSMENT/PLAN:                                                        ICD-10-CM    1. Post-menopausal bleeding  N95.0 Surgical Pathology Exam      2. Thickened endometrium  R93.89 Surgical Pathology Exam        Marielos Bowden is a 52 year old  with post menopausal bleeding, thickened endometrium, and fibroid uterus.   Reviewed ultrasound findings in detail with the patient.  Discussed enlarged subserosal fibroid.  Patient does not have a known history of fibroid uterus.  Also discussed the finding of a thickened endometrium.  Reviewed that she truly is menopausal due to having no menses for greater than 12 months.  As a result, this finding of thickened endometrium is considered abnormal.  I recommended endometrial sampling today to rule out the possibility of endometrial hyperplasia and/or endometrial cancer.  Her condition is an undiagnosed problem with an uncertain prognosis. Outside labs, FSH from  is reviewed and found to be borderline elevated.  Her menses continued for greater than 1 year after this lab was evaluated.  Reviewed the finding of fibroid uterus.  Enlarged right-sided subserosal fibroid is noted.  She denies any bulk symptoms or abnormal bleeding related to fibroids in the past.  Reviewed that reviewed that discontinuation of HRT may be recommended pending the pathology results.  If her bleeding subsides and the pathology report is benign, no additional follow-up is needed.  If she has ongoing bleeding after biopsy she will need to follow-up.  If any hyperplasia or cancer is noted on the biopsy she will be contacted to  discuss the next steps.     Endometrial Biopsy                                                                 Indication: Postmenopausal bleeding    Consent: Written consent signed and scanned into medical record.    Using a medium Graves speculum, the cervix was visualized. The cervix was prepped with Betadine.  A single tooth tenaculum was applied to the anterior lip of the cervix.  Os dilated with os finder. The endometrial pipelle was advanced through the cervix without difficulty and a sample collected. No additional pass was made.  The tenaculum was removed from the cervix and the tenaculum site made hemostatic with pressure.    EBL: 1 mL    Complications:  None apparent    Pathology: EMB sample was sent to pathology.  Tolerance of Procedure:  Patient did tolerate the procedure well.     Patient was instructed to call if she experiences any heavy bleeding, severe cramping, or abnormal vaginal discharge.  May take ibuprofen 400-800 mg PO TID PRN or naproxen 500 mg PO BID for cramping.    Will notify patient of results.    Astrid Schmidt MD, S  [facility] [place]  03/10/23

## 2023-03-09 ENCOUNTER — ANCILLARY PROCEDURE (OUTPATIENT)
Dept: ULTRASOUND IMAGING | Facility: CLINIC | Age: 53
End: 2023-03-09
Payer: COMMERCIAL

## 2023-03-09 DIAGNOSIS — N95.0 POSTMENOPAUSAL BLEEDING: ICD-10-CM

## 2023-03-09 PROCEDURE — 76830 TRANSVAGINAL US NON-OB: CPT | Performed by: STUDENT IN AN ORGANIZED HEALTH CARE EDUCATION/TRAINING PROGRAM

## 2023-03-10 ENCOUNTER — LAB (OUTPATIENT)
Dept: LAB | Facility: CLINIC | Age: 53
End: 2023-03-10
Payer: COMMERCIAL

## 2023-03-10 ENCOUNTER — OFFICE VISIT (OUTPATIENT)
Dept: OBGYN | Facility: CLINIC | Age: 53
End: 2023-03-10
Payer: COMMERCIAL

## 2023-03-10 VITALS
WEIGHT: 191 LBS | BODY MASS INDEX: 33.84 KG/M2 | DIASTOLIC BLOOD PRESSURE: 74 MMHG | HEIGHT: 63 IN | SYSTOLIC BLOOD PRESSURE: 118 MMHG

## 2023-03-10 DIAGNOSIS — Z13.1 SCREENING FOR DIABETES MELLITUS: ICD-10-CM

## 2023-03-10 DIAGNOSIS — Z13.6 ENCOUNTER FOR LIPID SCREENING FOR CARDIOVASCULAR DISEASE: ICD-10-CM

## 2023-03-10 DIAGNOSIS — Z13.220 ENCOUNTER FOR LIPID SCREENING FOR CARDIOVASCULAR DISEASE: ICD-10-CM

## 2023-03-10 DIAGNOSIS — N95.0 POST-MENOPAUSAL BLEEDING: Primary | ICD-10-CM

## 2023-03-10 DIAGNOSIS — R93.89 THICKENED ENDOMETRIUM: ICD-10-CM

## 2023-03-10 DIAGNOSIS — Z13.29 SCREENING FOR THYROID DISORDER: ICD-10-CM

## 2023-03-10 LAB
CHOLEST SERPL-MCNC: 251 MG/DL
GLUCOSE BLD-MCNC: 96 MG/DL (ref 60–99)
HDLC SERPL-MCNC: 72 MG/DL
LDLC SERPL CALC-MCNC: 157 MG/DL
NONHDLC SERPL-MCNC: 179 MG/DL
TRIGL SERPL-MCNC: 111 MG/DL
TSH SERPL DL<=0.005 MIU/L-ACNC: 3.63 UIU/ML (ref 0.3–4.2)

## 2023-03-10 PROCEDURE — 36415 COLL VENOUS BLD VENIPUNCTURE: CPT

## 2023-03-10 PROCEDURE — 58100 BIOPSY OF UTERUS LINING: CPT | Performed by: STUDENT IN AN ORGANIZED HEALTH CARE EDUCATION/TRAINING PROGRAM

## 2023-03-10 PROCEDURE — 88305 TISSUE EXAM BY PATHOLOGIST: CPT | Performed by: PATHOLOGY

## 2023-03-10 PROCEDURE — 82947 ASSAY GLUCOSE BLOOD QUANT: CPT

## 2023-03-10 PROCEDURE — 99213 OFFICE O/P EST LOW 20 MIN: CPT | Mod: 25 | Performed by: STUDENT IN AN ORGANIZED HEALTH CARE EDUCATION/TRAINING PROGRAM

## 2023-03-10 PROCEDURE — 84443 ASSAY THYROID STIM HORMONE: CPT

## 2023-03-10 PROCEDURE — 80061 LIPID PANEL: CPT

## 2023-03-11 NOTE — RESULT ENCOUNTER NOTE
Please inform of results; thyroid and fasting blood sugar normal but cholesterol quite elevated.  Both total and bad cholesterol (LDL) are above our ranges of 200 and 130 respectively.  Needs to work hard on diet and exercise and I would like her to repeat these labs in 3 months.  If still elevated, may need to see PCP or internal medicine to discuss medication

## 2023-03-13 ENCOUNTER — TELEPHONE (OUTPATIENT)
Dept: OBGYN | Facility: CLINIC | Age: 53
End: 2023-03-13
Payer: COMMERCIAL

## 2024-01-17 ENCOUNTER — HOSPITAL ENCOUNTER (OUTPATIENT)
Dept: MAMMOGRAPHY | Facility: CLINIC | Age: 54
Discharge: HOME | End: 2024-01-17
Admitting: OBSTETRICS & GYNECOLOGY
Payer: COMMERCIAL

## 2024-01-17 DIAGNOSIS — Z12.31 VISIT FOR SCREENING MAMMOGRAM: ICD-10-CM

## 2024-01-17 PROCEDURE — 77063 BREAST TOMOSYNTHESIS BI: CPT

## 2024-01-24 DIAGNOSIS — Z79.890 HORMONE REPLACEMENT THERAPY: ICD-10-CM

## 2024-01-24 DIAGNOSIS — N95.1 SYMPTOMATIC MENOPAUSAL OR FEMALE CLIMACTERIC STATES: ICD-10-CM

## 2024-01-24 RX ORDER — PROGESTERONE 200 MG/1
200 CAPSULE ORAL DAILY
Qty: 90 CAPSULE | Refills: 0 | Status: SHIPPED | OUTPATIENT
Start: 2024-01-24 | End: 2024-02-07

## 2024-01-24 RX ORDER — ESTRADIOL 2 MG/1
2 TABLET ORAL DAILY
Qty: 90 TABLET | Refills: 0 | Status: SHIPPED | OUTPATIENT
Start: 2024-01-24 | End: 2024-02-07

## 2024-01-24 NOTE — TELEPHONE ENCOUNTER
Requested Prescriptions   Pending Prescriptions Disp Refills    progesterone (PROMETRIUM) 200 MG capsule [Pharmacy Med Name: Progesterone Oral Capsule 200 MG] 90 capsule 0     Sig: Take 1 capsule (200 mg) by mouth daily.       Hormone Replacement Therapy Passed - 1/24/2024  2:00 AM        Passed - Blood pressure under 140/90 in past 12 months     BP Readings from Last 3 Encounters:   03/10/23 118/74   01/10/23 116/68   11/02/22 118/78                 Passed - Recent (12 mo) or future (30 days) visit within the authorizing provider's specialty     The patient must have completed an in-person or virtual visit within the past 12 months or has a future visit scheduled within the next 90 days with the authorizing provider s specialty.  Urgent care and e-visits do not quality as an office visit for this protocol.          Passed - Patient has mammogram in past 2 years on file if age 50-75        Passed - Medication is active on med list        Passed - Patient is 18 years of age or older        Passed - No active pregnancy on record        Passed - No positive pregnancy test on record in past 12 months          estradiol (ESTRACE) 2 MG tablet [Pharmacy Med Name: Estradiol Oral Tablet 2 MG] 90 tablet 0     Sig: Take 1 tablet (2 mg) by mouth daily.       Hormone Replacement Therapy Passed - 1/24/2024  2:00 AM        Passed - Blood pressure under 140/90 in past 12 months     BP Readings from Last 3 Encounters:   03/10/23 118/74   01/10/23 116/68   11/02/22 118/78                 Passed - Recent (12 mo) or future (30 days) visit within the authorizing provider's specialty     The patient must have completed an in-person or virtual visit within the past 12 months or has a future visit scheduled within the next 90 days with the authorizing provider s specialty.  Urgent care and e-visits do not quality as an office visit for this protocol.          Passed - Patient has mammogram in past 2 years on file if age 50-75        Passed  - Medication is active on med list        Passed - Patient is 18 years of age or older        Passed - No active pregnancy on record        Passed - No positive pregnancy test on record in past 12 months           Last Written Prescription Date:  1/10/23  Last Fill Quantity: 90,  # refills: 3   Last office visit: 3/10/2023 ; last virtual visit: Visit date not found with prescribing provider:  Anand     Future Office Visit:  02/9/24 with Dr. Schmidt      Medication is being filled for 1 time refill only due to:  Patient needs to be seen because due for annual in March.  Appointment scheduled.  Lissa Celestin RN on 1/24/2024 at 6:01 AM

## 2024-02-07 NOTE — PROGRESS NOTES
[facility]  OB/GYN Clinic Note    Marielos is a 53 year old  female who presents for annual exam.   Besides routine health maintenance, she has no other health concerns today .    HPI:  The patient's PCP is  Physician No Ref-Primary.    Patient presents for annual exam.   Pap due .    Sexualy active- yes, STI- no concerns. Menopausal, on HRT. Was supposed to go down to 1mg but had resurgence of VMS.   Mammogram done, colonoscopy- scheduled   Immunizations: Due for TDAP.   Family hx of breast, uterine, ovarian cancer- negative.   Diet/exercise- trying to eat well, walks every days, lifting weights.       GYNECOLOGIC HISTORY:  Patient's last menstrual period was 2021 (exact date).  Her current contraception method is: menopause.  She  reports that she has never smoked. She has never used smokeless tobacco.  Patient is sexually active.  STD testing offered?  Declined  Last PHQ-9 score on record =       2024     2:53 PM   PHQ-9 SCORE   PHQ-9 Total Score 0     Last GAD7 score on record =       2024     2:53 PM   DIANE-7 SCORE   Total Score 0     Alcohol Score = 0    HEALTH MAINTENANCE:  Recent Labs   Lab Test 03/10/23  0745   CHOL 251*   HDL 72   *   TRIG 111       Last Mammo:  24 , Result: Normal, Next Mammo: 2025   Pap:   Lab Results   Component Value Date    GYNINTERP  2021     Negative for Intraepithelial Lesion or Malignancy (NILM)    PAP NIL 2014    PAP NIL 2009    HPV-    Colonoscopy:  19, Result: polyps, Next Colonoscopy: 5 years. DUE 2024  Dexa:  NA    Health maintenance updated:  yes    HISTORY:  OB History    Para Term  AB Living   2 2 2 0 0 2   SAB IAB Ectopic Multiple Live Births   0 0 0 0 2      # Outcome Date GA Lbr Axel/2nd Weight Sex Delivery Anes PTL Lv   2 Term 1937    F    JIN   1 Term  38w0d   F    JIN       Patient Active Problem List   Diagnosis    Family history of colon cancer    CARDIOVASCULAR  SCREENING; LDL GOAL LESS THAN 160    History of basal cell carcinoma    Hormone replacement therapy     Past Surgical History:   Procedure Laterality Date    Z NONSPECIFIC PROCEDURE      PE tubes's x 12 secondary to ear infections    Z NONSPECIFIC PROCEDURE      L  TM reconstruction (2nd time)    Z NONSPECIFIC PROCEDURE  child     s/p tonsillectomy & adenoidectomy    Z NONSPECIFIC PROCEDURE  ,     2 NSVDs      Social History     Tobacco Use    Smoking status: Never    Smokeless tobacco: Never   Substance Use Topics    Alcohol use: Yes     Comment: rare      Problem (# of Occurrences) Relation (Name,Age of Onset)    Cancer (1) Maternal Grandmother: cervical cancer    Diabetes (1) Maternal Grandmother: great grandmother    Gynecology (1) Maternal Grandmother    Heart Disease (1) Daughter (15): ackerman parkinson with stent    Hypertension (2) Mother, Father    Lipids (1) Father    Cancer - colorectal (2) Maternal Grandmother (80): recently  , Maternal Aunt: great aunt    C.A.D. (1) Paternal Grandfather: CABG    Basal cell carcinoma (2) Father, Paternal Grandmother    Lung Cancer (1) Mother: 2016    Skin Cancer (1) Maternal Aunt    No Known Problems (4) Sister, Brother, Maternal Grandfather, Other           Negative family history of: Breast Cancer              Current Outpatient Medications   Medication Sig    estradiol (ESTRACE) 2 MG tablet Take 1 tablet (2 mg) by mouth daily    glucosamine-chondroitin 500-400 MG CAPS per capsule Take 1 capsule by mouth daily    guaiFENesin (MUCINEX) 600 MG 12 hr tablet Take 600 mg by mouth daily    ivermectin (SOOLANTRA) 1 % cream Apply to the affected areas of the face once daily.    loratadine-pseudoePHEDrine (CLARITIN-D 24-HOUR)  MG per tablet Take 1 tablet by mouth daily    progesterone (PROMETRIUM) 200 MG capsule Take 1 capsule (200 mg) by mouth daily    vitamin B-12 (CYANOCOBALAMIN) 250 MCG tablet Take 250 mcg by mouth daily    Vitamin D,  "Cholecalciferol, 25 MCG (1000 UT) TABS      No current facility-administered medications for this visit.     Allergies   Allergen Reactions    No Known Drug Allergy        Past medical, surgical, social and family histories were reviewed and updated in EPIC.        EXAM:  /72   Ht 1.607 m (5' 3.25\")   Wt 86.9 kg (191 lb 9.6 oz)   LMP 01/20/2021 (Exact Date)   BMI 33.67 kg/m     BMI: Body mass index is 33.67 kg/m .    PHYSICAL EXAM:  Constitutional:   Appearance: Well nourished, well developed, alert, in no acute distress  Breasts: Inspection of Breasts:  No lymphadenopathy present., Palpation of Breasts and Axillae:  No masses present on palpation, no breast tenderness., Axillary Lymph Nodes:  No lymphadenopathy present., and No nodularity, asymmetry or nipple discharge bilaterally.  Gastrointestinal:   Abdominal Examination:  Abdomen nontender to palpation, tone normal without rigidity or guarding, no masses present, umbilicus without lesions   Liver and Spleen:  No hepatomegaly present, liver nontender to palpation    Hernias:  No hernias present  Lymphatic: Lymph Nodes:  No other lymphadenopathy present  Skin:  General Inspection:  No rashes present, no lesions present, no areas of  discoloration  Neurologic:    Mental Status:  Oriented X3.  Normal strength and tone, sensory exam                grossly normal, mentation intact and speech normal.    Psychiatric:   Mentation appears normal and affect normal/bright.         Pelvic Exam:  External Genitalia:     Normal appearance for age, no discharge present, no tenderness present, no inflammatory lesions present, color normal  Vagina:     Normal vaginal vault without central or paravaginal defects, no discharge present, no inflammatory lesions present, no masses present  Bladder:     Nontender to palpation  Urethra:   Urethral Body:  Urethra palpation normal, urethra structural support normal   Urethral Meatus:  No erythema or lesions present  Cervix:  "    Appearance healthy, no lesions present, nontender to palpation, no bleeding present  Uterus:     Uterus: firm, normal sized and nontender, anteverted in position. Fibroid is palpated in right cul-du-sac  Adnexa:     No adnexal tenderness present, no adnexal masses present      COUNSELING:   Reviewed preventive health counseling, as reflected in patient instructions       Regular exercise       Healthy diet/nutrition       (Leonor)menopause management    BMI: Body mass index is 33.67 kg/m .  Weight management plan: Discussed healthy diet and exercise guidelines    ASSESSMENT:  53 year old female with satisfactory annual exam.    ICD-10-CM    1. Encounter for routine gynecological examination  Z01.419       2. Symptomatic menopausal or female climacteric states  N95.1 estradiol (ESTRACE) 2 MG tablet     progesterone (PROMETRIUM) 200 MG capsule      3. Hormone replacement therapy  Z79.890 estradiol (ESTRACE) 2 MG tablet     progesterone (PROMETRIUM) 200 MG capsule      4. Elevated lipids  E78.5           PLAN:  Normal annual exam. Reviewed healthy diet and exercise guidelines. Mammogram today. TDAP today. Pap due 2026.   Declines cholesterol recheck today- states she is healthy and is not concerned about elevated cholesterol. Reviewed risks of elevated cholesterol for overall health and heart health. Recommend establishing care with PCP.     Regarding HRT- lengthy discussion today about risks and benefits of continued long term use of HRT. Attempted to wean to 1mg and experienced resurgence of hot flashes. Returned to 2mg dose. Discussed risks of breast cancer, DVT, heart disease with long term use. Continue annual med review to determine if ongoing HRT at current dose is needed.   Discussed use of patch to decrease risk of DVT. She will consider and reach out if rx is desired. Refill for original dose of 2mg estradiol and 200mg prometrium daily provided.     Astrid Schmidt MD, MHS  02/09/24

## 2024-02-09 ENCOUNTER — OFFICE VISIT (OUTPATIENT)
Dept: OBGYN | Facility: CLINIC | Age: 54
End: 2024-02-09
Attending: STUDENT IN AN ORGANIZED HEALTH CARE EDUCATION/TRAINING PROGRAM
Payer: COMMERCIAL

## 2024-02-09 VITALS
SYSTOLIC BLOOD PRESSURE: 124 MMHG | WEIGHT: 191.6 LBS | DIASTOLIC BLOOD PRESSURE: 72 MMHG | BODY MASS INDEX: 33.95 KG/M2 | HEIGHT: 63 IN

## 2024-02-09 DIAGNOSIS — E78.5 ELEVATED LIPIDS: ICD-10-CM

## 2024-02-09 DIAGNOSIS — Z01.419 WOMEN'S ANNUAL ROUTINE GYNECOLOGICAL EXAMINATION: Primary | ICD-10-CM

## 2024-02-09 DIAGNOSIS — Z79.890 HORMONE REPLACEMENT THERAPY: ICD-10-CM

## 2024-02-09 DIAGNOSIS — Z23 NEED FOR VACCINATION: ICD-10-CM

## 2024-02-09 DIAGNOSIS — N95.1 SYMPTOMATIC MENOPAUSAL OR FEMALE CLIMACTERIC STATES: ICD-10-CM

## 2024-02-09 PROCEDURE — 99396 PREV VISIT EST AGE 40-64: CPT | Mod: 25 | Performed by: STUDENT IN AN ORGANIZED HEALTH CARE EDUCATION/TRAINING PROGRAM

## 2024-02-09 PROCEDURE — 90715 TDAP VACCINE 7 YRS/> IM: CPT | Performed by: STUDENT IN AN ORGANIZED HEALTH CARE EDUCATION/TRAINING PROGRAM

## 2024-02-09 PROCEDURE — 90471 IMMUNIZATION ADMIN: CPT | Performed by: STUDENT IN AN ORGANIZED HEALTH CARE EDUCATION/TRAINING PROGRAM

## 2024-02-09 PROCEDURE — 99213 OFFICE O/P EST LOW 20 MIN: CPT | Mod: 25 | Performed by: STUDENT IN AN ORGANIZED HEALTH CARE EDUCATION/TRAINING PROGRAM

## 2024-02-09 RX ORDER — PROGESTERONE 200 MG/1
200 CAPSULE ORAL DAILY
Qty: 90 CAPSULE | Refills: 3 | Status: SHIPPED | OUTPATIENT
Start: 2024-02-09

## 2024-02-09 RX ORDER — ESTRADIOL 2 MG/1
2 TABLET ORAL DAILY
Qty: 90 TABLET | Refills: 3 | Status: SHIPPED | OUTPATIENT
Start: 2024-02-09

## 2024-02-09 ASSESSMENT — ANXIETY QUESTIONNAIRES
3. WORRYING TOO MUCH ABOUT DIFFERENT THINGS: NOT AT ALL
2. NOT BEING ABLE TO STOP OR CONTROL WORRYING: NOT AT ALL
6. BECOMING EASILY ANNOYED OR IRRITABLE: NOT AT ALL
GAD7 TOTAL SCORE: 0
IF YOU CHECKED OFF ANY PROBLEMS ON THIS QUESTIONNAIRE, HOW DIFFICULT HAVE THESE PROBLEMS MADE IT FOR YOU TO DO YOUR WORK, TAKE CARE OF THINGS AT HOME, OR GET ALONG WITH OTHER PEOPLE: NOT DIFFICULT AT ALL
7. FEELING AFRAID AS IF SOMETHING AWFUL MIGHT HAPPEN: NOT AT ALL
1. FEELING NERVOUS, ANXIOUS, OR ON EDGE: NOT AT ALL
5. BEING SO RESTLESS THAT IT IS HARD TO SIT STILL: NOT AT ALL
GAD7 TOTAL SCORE: 0

## 2024-02-09 ASSESSMENT — PATIENT HEALTH QUESTIONNAIRE - PHQ9
SUM OF ALL RESPONSES TO PHQ QUESTIONS 1-9: 0
5. POOR APPETITE OR OVEREATING: NOT AT ALL

## 2024-06-10 ENCOUNTER — TRANSFERRED RECORDS (OUTPATIENT)
Dept: HEALTH INFORMATION MANAGEMENT | Facility: CLINIC | Age: 54
End: 2024-06-10
Payer: COMMERCIAL

## 2025-01-14 ENCOUNTER — OFFICE VISIT (OUTPATIENT)
Dept: FAMILY MEDICINE | Facility: CLINIC | Age: 55
End: 2025-01-14
Payer: COMMERCIAL

## 2025-01-14 VITALS
TEMPERATURE: 98.1 F | RESPIRATION RATE: 16 BRPM | HEART RATE: 77 BPM | HEIGHT: 63 IN | WEIGHT: 185.5 LBS | BODY MASS INDEX: 32.87 KG/M2 | DIASTOLIC BLOOD PRESSURE: 88 MMHG | SYSTOLIC BLOOD PRESSURE: 134 MMHG

## 2025-01-14 DIAGNOSIS — Z01.818 PREOP GENERAL PHYSICAL EXAM: Primary | ICD-10-CM

## 2025-01-14 DIAGNOSIS — G56.02 CARPAL TUNNEL SYNDROME OF LEFT WRIST: ICD-10-CM

## 2025-01-14 PROBLEM — L71.9 ROSACEA: Status: ACTIVE | Noted: 2017-05-06

## 2025-01-14 PROCEDURE — 99214 OFFICE O/P EST MOD 30 MIN: CPT

## 2025-01-14 RX ORDER — LORATADINE 10 MG/1
10 TABLET ORAL DAILY
COMMUNITY

## 2025-01-14 NOTE — PROGRESS NOTES
Preoperative Evaluation  [facility]  [address] [facility] [address]  Phone: [phone]  Primary Provider: Physician No Ref-Primary  Pre-op Performing Provider: EFFIE Frazier CNP  Jan 14, 2025 1/14/2025   Surgical Information   What procedure is being done? Carpal tunnel   [facility] or [facility] where procedure[facility]surgery [facility] be [facility]   Who is doing the procedure / surgery? Dr. Chelsea Sotelo   Date of surgery / procedure: 1/23/2025   Time of surgery / procedure: AM   Where do you plan to recover after surgery? at home with family     Fax number for surgical facility: [phone]    Assessment & Plan     The proposed surgical procedure is considered INTERMEDIATE risk.    Preop general physical exam  Carpal tunnel syndrome of left wrist  Pre op instructions reviewed with patient. All questions/concerns answered. No cardiac or exertional symptoms             Risks and Recommendations  The patient has the following additional risks and recommendations for perioperative complications:   - No identified additional risk factors other than previously addressed    Antiplatelet or Anticoagulation Medication Instructions   - Patient is on no antiplatelet or anticoagulation medications.    Additional Medication Instructions   - Herbal medications and vitamins: DO NOT TAKE 14 days prior to surgery.  - loratadine:  hold day of surgery  - hormone meds: continue without modification  - guaifenesin: hold day of surgery     Recommendation  Approval given to proceed with proposed procedure, without further diagnostic evaluation.    Pratibha Arceo is a 54 year old, presenting for the following:  Pre-Op Exam        1/14/2025     9:38 AM   Additional Questions   Roomed by Ayesha PALACIO MA         1/14/2025   Forms   Any forms needing to be completed Yes     HPI related to upcoming procedure: Carpal Tunnel left         1/14/2025   Pre-Op Questionnaire   Have you ever had  a heart attack or stroke? No   Have you ever had surgery on your heart or blood vessels, such as a stent placement, a coronary artery bypass, or surgery on an artery in your head, neck, heart, or legs? No   Do you have chest pain with activity? No   Do you have a history of heart failure? No   Do you currently have a cold, bronchitis or symptoms of other infection? No   Do you have a cough, shortness of breath, or wheezing? No   Do you or anyone in your family have previous history of blood clots? No   Do you or does anyone in your family have a serious bleeding problem such as prolonged bleeding following surgeries or cuts? No   Have you ever had problems with anemia or been told to take iron pills? No   Have you had any abnormal blood loss such as black, tarry or bloody stools, or abnormal vaginal bleeding? No   Have you ever had a blood transfusion? No   Are you willing to have a blood transfusion if it is medically needed before, during, or after your surgery? Yes   Have you or any of your relatives ever had problems with anesthesia? (!) YES Mother had trouble coming out of it. She has no personal    Do you have sleep apnea, excessive snoring or daytime drowsiness? No   Do you have any artifical heart valves or other implanted medical devices like a pacemaker, defibrillator, or continuous glucose monitor? No   Do you have artificial joints? No   Are you allergic to latex? No     Health Care Directive  Patient does not have a Health Care Directive: Patient states has Advance Directive and will bring in a copy to clinic.    Preoperative Review of    reviewed - no record of controlled substances prescribed.        Patient Active Problem List    Diagnosis Date Noted    Hormone replacement therapy      Priority: Medium    Rosacea 05/06/2017     Priority: Medium    History of basal cell carcinoma 05/02/2012     Priority: Medium    CARDIOVASCULAR SCREENING; LDL GOAL LESS THAN 160 10/31/2010     Priority: Medium     Family history of colon cancer 02/23/2009     Priority: Medium     MGM, Mat Great aunt; cousin. Mom with noncancerous polyps removed.        Past Medical History:   Diagnosis Date    Basal cell carcinoma     Hormone replacement therapy     Malignant neoplasm (H)     Hx basal cell Ca    Simple or unspecified chronic serous otitis media as a child     Past Surgical History:   Procedure Laterality Date    BIOPSY  2014    Breast biopsy    COLONOSCOPY      At age 38    ENT SURGERY      [facility] NONSPECIFIC PROCEDURE      PE tubes's x 12 secondary to ear infections    [facility] NONSPECIFIC PROCEDURE  01/01/1994    L  TM reconstruction (2nd time)    [facility] NONSPECIFIC PROCEDURE  child     s/p tonsillectomy & adenoidectomy    [facility] NONSPECIFIC PROCEDURE  1996, 1998    2 NSVDs     Current Outpatient Medications   Medication Sig Dispense Refill    estradiol (ESTRACE) 2 MG tablet Take 1 tablet (2 mg) by mouth daily 90 tablet 3    glucosamine-chondroitin 500-400 MG CAPS per capsule Take 1 capsule by mouth daily      guaiFENesin (MUCINEX) 600 MG 12 hr tablet Take 600 mg by mouth daily      ivermectin (SOOLANTRA) 1 % cream Apply to the affected areas of the face once daily. 45 g 3    loratadine-pseudoePHEDrine (CLARITIN-D 24-HOUR)  MG per tablet Take 1 tablet by mouth daily      progesterone (PROMETRIUM) 200 MG capsule Take 1 capsule (200 mg) by mouth daily 90 capsule 3    vitamin B-12 (CYANOCOBALAMIN) 250 MCG tablet Take 250 mcg by mouth daily      Vitamin D, Cholecalciferol, 25 MCG (1000 UT) TABS          Allergies   Allergen Reactions    No Known Drug Allergy         Social History     Tobacco Use    Smoking status: Never    Smokeless tobacco: Never   Substance Use Topics    Alcohol use: Yes     Comment: rare     History   Drug Use No             Review of Systems  CONSTITUTIONAL: NEGATIVE for fever, chills, change in weight  INTEGUMENTARY/SKIN: NEGATIVE for worrisome rashes, moles or lesions  EYES: NEGATIVE for vision changes or  "irritation  ENT/MOUTH: NEGATIVE for ear, mouth and throat problems  RESP: NEGATIVE for significant cough or SOB  CV: NEGATIVE for chest pain, palpitations or peripheral edema  GI: NEGATIVE for nausea, abdominal pain, heartburn, or change in bowel habits  : NEGATIVE for frequency, dysuria, or hematuria  MUSCULOSKELETAL: NEGATIVE for significant arthralgias or myalgia  NEURO: NEGATIVE for weakness, dizziness or paresthesias  ENDOCRINE: NEGATIVE for temperature intolerance, skin/hair changes  HEME: NEGATIVE for bleeding problems    Objective    /88 (BP Location: Right arm, Patient Position: Sitting, Cuff Size: Adult Large)   Pulse 77   Temp 98.1  F (36.7  C) (Temporal)   Resp 16   Ht 1.607 m (5' 3.25\")   Wt 84.1 kg (185 lb 8 oz)   LMP 01/20/2021 (Exact Date)   BMI 32.60 kg/m     Estimated body mass index is 32.6 kg/m  as calculated from the following:    Height as of this encounter: 1.607 m (5' 3.25\").    Weight as of this encounter: 84.1 kg (185 lb 8 oz).  Physical Exam  GENERAL: alert and no distress  EYES: Eyes grossly normal to inspection, and conjunctivae and sclerae normal  HENT: R TM with clear effusion, no erythema or drainage. L ear with impacted cerumen, unable to view TM, nose and mouth without ulcers or lesions  NECK: no adenopathy, no asymmetry, masses, or scars  RESP: lungs clear to auscultation - no rales, rhonchi or wheezes  CV: regular rate and rhythm, normal S1 S2, no S3 or S4, no murmur, click or rub, no peripheral edema  MS: no gross musculoskeletal defects noted, no edema  PSYCH: mentation appears normal, affect normal/bright    No results for input(s): \"HGB\", \"PLT\", \"INR\", \"NA\", \"POTASSIUM\", \"CR\", \"A1C\" in the last 8760 hours.     Diagnostics  No labs were ordered during this visit.   No EKG required, no history of coronary heart disease, significant arrhythmia, peripheral arterial disease or other structural heart disease.    Revised Cardiac Risk Index (RCRI)  The patient has the " following serious cardiovascular risks for perioperative complications:   - No serious cardiac risks = 0 points     RCRI Interpretation: 0 points: Class I (very low risk - 0.4% complication rate)         Signed Electronically by: EFFIE Frazier CNP  A copy of this evaluation report is provided to the requesting physician.

## 2025-01-14 NOTE — PATIENT INSTRUCTIONS
How to Take Your Medication Before Surgery  Preoperative Medication Instructions   Antiplatelet or Anticoagulation Medication Instructions   - Patient is on no antiplatelet or anticoagulation medications.     Additional Medication Instructions   - Herbal medications and vitamins: DO NOT TAKE 14 days prior to surgery.  - loratadine:  hold day of surgery  - hormone meds: continue without modification  - guaifenesin: hold day of surgery           Patient Education   Preparing for Your Surgery  For Adults  Getting started  In most cases, a nurse will call to review your health history and instructions. They will give you an arrival time based on your scheduled surgery time. Please be ready to share:  Your doctor's clinic name and phone number  Your medical, surgical, and anesthesia history  A list of allergies and sensitivities  A list of medicines, including herbal treatments and over-the-counter drugs  Whether the patient has a legal guardian (ask how to send us the papers in advance)  Note: You may not receive a call if you were seen at our PAC (Preoperative Assessment Center).  Please tell us if you're pregnant--or if there's any chance you might be pregnant. Some surgeries may injure a fetus (unborn baby), so they require a pregnancy test. Surgeries that are safe for a fetus don't always need a test, and you can choose whether to have one.   Preparing for surgery  Within 10 to 30 days of surgery: Have a pre-op exam (sometimes called an H&P, or History and Physical). This can be done at a clinic or pre-operative center.  If you're having a , you may not need this exam. Talk to your care team.  At your pre-op exam, talk to your care team about all medicines you take. (This includes CBD oil and any drugs, such as THC, marijuana, and other forms of cannabis.) If you need to stop any medicine before surgery, ask when to start taking it again.  This is for your safety. Many medicines and drugs can make you bleed  too much during surgery. Some change how well surgery (anesthesia) drugs work.  Call your insurance company to let them know you're having surgery. (If you don't have insurance, call [phone].)  Call your clinic if there's any change in your health. This includes a scrape or scratch near the surgery site, or any signs of a cold (sore throat, runny nose, cough, rash, fever).  Eating and drinking guidelines  For your safety: Unless your surgeon tells you otherwise, follow the guidelines below.  Eat and drink as normal until 8 hours before you arrive for surgery. After that, no food or milk. You can spit out gum when you arrive.  Drink clear liquids until 2 hours before you arrive. These are liquids you can see through, like water, Gatorade, and Propel Water. They also include plain black coffee and tea (no cream or milk).  No alcohol for 24 hours before you arrive. The night before surgery, stop any drinks that contain THC.  If your care team tells you to take medicine on the morning of surgery, it's okay to take it with a sip of water. No other medicines or drugs are allowed (including CBD oil)--follow your care team's instructions.  If you have questions the day of surgery, call your hospital or surgery center.   Preventing infection  Shower or bathe the night before and the morning of surgery. Follow the instructions your clinic gave you. (If no instructions, use regular soap.)  Don't shave or clip hair near your surgery site. We'll remove the hair if needed.  Don't smoke or vape the morning of surgery. No chewing tobacco for 6 hours before you arrive. A nicotine patch is okay. You may spit out nicotine gum when you arrive.  For some surgeries, the surgeon will tell you to fully quit smoking and nicotine.  We will make every effort to keep you safe from infection. We will:  Clean our hands often with soap and water (or an alcohol-based hand rub).  Clean the skin at your surgery site with a special soap that  kills germs.  Give you a special gown to keep you warm. (Cold raises the risk of infection.)  Wear hair covers, masks, gowns, and gloves during surgery.  Give antibiotic medicine, if prescribed. Not all surgeries need this medicine.  What to bring on the day of surgery  Photo ID and insurance card  Copy of your health care directive, if you have one  Glasses and hearing aids (bring cases)  You can't wear contacts during surgery  Inhaler and eye drops, if you use them (tell us about these when you arrive)  CPAP machine or breathing device, if you use them  A few personal items, if spending the night  If you have . . .  A pacemaker, ICD (cardiac defibrillator), or other implant: Bring the ID card.  An implanted stimulator: Bring the remote control.  A legal guardian: Bring a copy of the certified (court-stamped) guardianship papers.  Please remove any jewelry, including body piercings. Leave jewelry and other valuables at home.  If you're going home the day of surgery  You must have a responsible adult drive you home. They should stay with you overnight as well.  If you don't have someone to stay with you, and you aren't safe to go home alone, we may keep you overnight. Insurance often won't pay for this.  After surgery  If it's hard to control your pain or you need more pain medicine, please call your surgeon's office.  Questions?   If you have any questions for your care team, list them here:   ____________________________________________________________________________________________________________________________________________________________________________________________________________________________________________________________  For informational purposes only. Not to replace the advice of your health care provider. Copyright   2003, 2019 [facility]. All rights reserved. Clinically reviewed by Sarabjit Marino MD. [employer] 266301 - REV 08/24.

## 2025-02-10 DIAGNOSIS — Z79.890 HORMONE REPLACEMENT THERAPY: ICD-10-CM

## 2025-02-10 DIAGNOSIS — N95.1 SYMPTOMATIC MENOPAUSAL OR FEMALE CLIMACTERIC STATES: ICD-10-CM

## 2025-02-10 RX ORDER — ESTRADIOL 2 MG/1
2 TABLET ORAL DAILY
Qty: 30 TABLET | Refills: 0 | OUTPATIENT
Start: 2025-02-10

## 2025-02-10 NOTE — TELEPHONE ENCOUNTER
Requested Prescriptions   Pending Prescriptions Disp Refills    estradiol (ESTRACE) 2 MG tablet 90 tablet 3     Sig: Take 1 tablet (2 mg) by mouth daily.       Hormone Replacement Therapy Passed - 2/10/2025  2:31 PM        Passed - Most recent blood pressure under 140/90 in past 12 months     BP Readings from Last 3 Encounters:   01/14/25 134/88   02/09/24 124/72   03/10/23 118/74       No data recorded            Passed - Medication is active on med list        Passed - Recent (12 mo) or future (90 days) visit within the authorizing provider's specialty     The patient must have completed an in-person or virtual visit within the past 12 months or has a future visit scheduled within the next 90 days with the authorizing provider s specialty.  Urgent care and e-visits do not qualify as an office visit for this protocol.          Passed - Medication indicated for associated diagnosis     The medication is prescribed for one or more of the following conditions:    Menopause   Vulva/Vaginal atrophy   Low Estrogen   Gender Dysphoria   Male to Female transgender          Passed - Patient is 18 years of age or older        Passed - No active pregnancy on record        Passed - No positive pregnancy test on record in past 12 months           Last Written Prescription Date:  02/09/2024  Last Fill Quantity: 90,  # refills: 3   Last office visit: 2/9/2024 ; last virtual visit: Visit date not found with prescribing provider:  Astrid Schmidt MD    Future Office Visit:  02/11/2025 annual with Dr. Loretta Michel.

## 2025-02-10 NOTE — TELEPHONE ENCOUNTER
Can be addressed at tomorrow's clinic visit for refills.    Karla De Jesus RN on 2/10/2025 at 3:21 PM

## 2025-02-11 ENCOUNTER — ANCILLARY PROCEDURE (OUTPATIENT)
Dept: MAMMOGRAPHY | Facility: CLINIC | Age: 55
End: 2025-02-11
Payer: COMMERCIAL

## 2025-02-11 ENCOUNTER — OFFICE VISIT (OUTPATIENT)
Dept: OBGYN | Facility: CLINIC | Age: 55
End: 2025-02-11
Attending: OBSTETRICS & GYNECOLOGY
Payer: COMMERCIAL

## 2025-02-11 VITALS
WEIGHT: 190 LBS | HEIGHT: 63 IN | SYSTOLIC BLOOD PRESSURE: 122 MMHG | DIASTOLIC BLOOD PRESSURE: 78 MMHG | BODY MASS INDEX: 33.66 KG/M2

## 2025-02-11 DIAGNOSIS — Z01.419 WOMEN'S ANNUAL ROUTINE GYNECOLOGICAL EXAMINATION: Primary | ICD-10-CM

## 2025-02-11 DIAGNOSIS — Z12.31 VISIT FOR SCREENING MAMMOGRAM: ICD-10-CM

## 2025-02-11 DIAGNOSIS — N95.1 SYMPTOMATIC MENOPAUSAL OR FEMALE CLIMACTERIC STATES: ICD-10-CM

## 2025-02-11 DIAGNOSIS — Z79.890 HORMONE REPLACEMENT THERAPY: ICD-10-CM

## 2025-02-11 PROCEDURE — 99396 PREV VISIT EST AGE 40-64: CPT | Performed by: OBSTETRICS & GYNECOLOGY

## 2025-02-11 PROCEDURE — 77067 SCR MAMMO BI INCL CAD: CPT | Mod: TC | Performed by: RADIOLOGY

## 2025-02-11 PROCEDURE — 77063 BREAST TOMOSYNTHESIS BI: CPT | Mod: TC | Performed by: RADIOLOGY

## 2025-02-11 RX ORDER — ESTRADIOL 1 MG/1
2 TABLET ORAL DAILY
Qty: 180 TABLET | Refills: 3 | Status: SHIPPED | OUTPATIENT
Start: 2025-02-11

## 2025-02-11 ASSESSMENT — PATIENT HEALTH QUESTIONNAIRE - PHQ9
5. POOR APPETITE OR OVEREATING: NOT AT ALL
SUM OF ALL RESPONSES TO PHQ QUESTIONS 1-9: 0

## 2025-02-11 ASSESSMENT — ANXIETY QUESTIONNAIRES
7. FEELING AFRAID AS IF SOMETHING AWFUL MIGHT HAPPEN: NOT AT ALL
6. BECOMING EASILY ANNOYED OR IRRITABLE: NOT AT ALL
1. FEELING NERVOUS, ANXIOUS, OR ON EDGE: NOT AT ALL
GAD7 TOTAL SCORE: 0
5. BEING SO RESTLESS THAT IT IS HARD TO SIT STILL: NOT AT ALL
IF YOU CHECKED OFF ANY PROBLEMS ON THIS QUESTIONNAIRE, HOW DIFFICULT HAVE THESE PROBLEMS MADE IT FOR YOU TO DO YOUR WORK, TAKE CARE OF THINGS AT HOME, OR GET ALONG WITH OTHER PEOPLE: NOT DIFFICULT AT ALL
3. WORRYING TOO MUCH ABOUT DIFFERENT THINGS: NOT AT ALL
2. NOT BEING ABLE TO STOP OR CONTROL WORRYING: NOT AT ALL
GAD7 TOTAL SCORE: 0

## 2025-02-11 NOTE — PROGRESS NOTES
Marielos is a 54 year old  female who presents for annual exam.     Besides routine health maintenance, she has no other health concerns today .    HPI:  Here today for yearly exam --doing well.  Postmenopausal since 2021.  Has been on HRT since 2018.  Currently doing well on 2mg daily dosing of estrace.  No hot flushes or night sweats.   Did try to wean last year but had return of symptoms and resumed right away.   Willing to try slower weaning process.  +SA but infrequent.  +dryness with sex.  Using lubricant.  No bowel/bladder issues.  Urinary frequency but very good hydrator    ; works as  at [employer] --working all in person;  2 daughters;  lost  her 75yo mother this past fall  -staying active with walking and home exercises; tries to do wall sit daily, planks, strengthening, etc  +mammo today; +SBE --no issues  PCP -Kenzie Robles --follows labs, meds, etc  -up to date on colonoscopy --+family hx and personal history of polyps so will repeat in 3yrs  -has had both flu and covid vaccines      GYNECOLOGIC HISTORY:    Patient's last menstrual period was 2021 (exact date).      Her current contraception method is: vasectomy and menopause.  She  reports that she has never smoked. She has never used smokeless tobacco.    Patient is sexually active.  STD testing offered?  Declined  Last PHQ-9 score on record =       2024     2:53 PM   PHQ-9 SCORE   PHQ-9 Total Score 0     Last GAD7 score on record =       2024     2:53 PM   DIANE-7 SCORE   Total Score 0     Alcohol Score = 0    HEALTH MAINTENANCE:  Cholesterol:   Cholesterol   Date Value Ref Range Status   03/10/2023 251 (H) <200 mg/dL Final   2015 229 (H) <200 mg/dL Final     Comment:     LDL Cholesterol is the primary guide to therapy.   The NCEP recommends further evaluation of: patients with cholesterol greater   than 200 mg/dL if additional risk factors are present, cholesterol greater   than   240  mg/dL, triglycerides greater than 150 mg/dL, or HDL less than 40 mg/dL.     2014 209 (H) 0 - 200 mg/dL Final     Comment:     LDL Cholesterol is the primary guide to therapy.   The NCEP recommends further evaluation of: patients with cholesterol greater   than 200 mg/dL if additional risk factors are present, cholesterol greater   than   240 mg/dL, triglycerides greater than 150 mg/dL, or HDL less than 40 mg/dL.     Last Mammo: One year ago, Result: Normal, Next Mammo: Today   Pap:   Lab Results   Component Value Date    GYNINTERP  2021     Negative for Intraepithelial Lesion or Malignancy (NILM)    PAP NIL 2014    PAP NIL 2009     Colonoscopy:  , Result: polyps, Next Colonoscopy: 3 years.  Dexa:  NA    Health maintenance updated:  yes    HISTORY:  OB History    Para Term  AB Living   2 2 2 0 0 2   SAB IAB Ectopic Multiple Live Births   0 0 0 0 2      # Outcome Date GA Lbr Axel/2nd Weight Sex Type Anes PTL Lv   2 Term 1939    F    JIN   1 Term  38w0d   F    JIN       Patient Active Problem List   Diagnosis    Family history of colon cancer    CARDIOVASCULAR SCREENING; LDL GOAL LESS THAN 160    History of basal cell carcinoma    Hormone replacement therapy    Rosacea     Past Surgical History:   Procedure Laterality Date    BIOPSY      Breast biopsy    CARPAL TUNNEL RELEASE RT/LT Left 2025    COLONOSCOPY      At age 38    ENT SURGERY      [facility] NONSPECIFIC PROCEDURE      PE tubes's x 12 secondary to ear infections    Z NONSPECIFIC PROCEDURE  1994    L  TM reconstruction (2nd time)    Z NONSPECIFIC PROCEDURE  child     s/p tonsillectomy & adenoidectomy    Z NONSPECIFIC PROCEDURE  ,     2 NSVDs      Social History     Tobacco Use    Smoking status: Never    Smokeless tobacco: Never   Substance Use Topics    Alcohol use: Yes     Comment: rare      Problem (# of Occurrences) Relation (Name,Age of Onset)    Cancer (1) Maternal Grandmother:  "cervical cancer    Diabetes (1) Maternal Grandmother: great grandmother    Gynecology (1) Maternal Grandmother    Heart Disease (1) Daughter (15): ackerman parkinson with stent    Hypertension (2) Mother, Father    Lipids (1) Father    Cancer - colorectal (2) Maternal Grandmother (80): recently  , Maternal Aunt: great aunt    SARAH.A.D. (1) Paternal Grandfather: CABG    Basal cell carcinoma (2) Father, Paternal Grandmother    Lung Cancer (1) Mother: 2016, smoker    Skin Cancer (1) Maternal Aunt    No Known Problems (4) Sister, Brother, Maternal Grandfather, Other           Negative family history of: Breast Cancer              Current Outpatient Medications   Medication Sig Dispense Refill    estradiol (ESTRACE) 1 MG tablet Take 2 tablets (2 mg) by mouth daily. 180 tablet 3    glucosamine-chondroitin 500-400 MG CAPS per capsule Take 1 capsule by mouth daily      guaiFENesin (MUCINEX) 600 MG 12 hr tablet Take 600 mg by mouth daily      ivermectin (SOOLANTRA) 1 % cream Apply to the affected areas of the face once daily. 45 g 3    loratadine (CLARITIN) 10 MG tablet Take 10 mg by mouth daily.      progesterone (PROMETRIUM) 200 MG capsule Take 1 capsule (200 mg) by mouth daily 90 capsule 3    vitamin B-12 (CYANOCOBALAMIN) 250 MCG tablet Take 250 mcg by mouth daily      Vitamin D, Cholecalciferol, 25 MCG (1000 UT) TABS        No current facility-administered medications for this visit.     Allergies   Allergen Reactions    No Known Drug Allergy        Past medical, surgical, social and family histories were reviewed and updated in EPIC.    ROS:   12 point review of systems negative other than symptoms noted below or in the HPI.  POSITIVE for:, urinary frequency    EXAM:  /78   Ht 1.588 m (5' 2.5\")   Wt 86.2 kg (190 lb)   LMP 2021 (Exact Date)   BMI 34.20 kg/m     BMI: Body mass index is 34.2 kg/m .    PHYSICAL EXAM:  Constitutional:   Appearance: Well nourished, well developed, alert, in no acute " distress  Neck:  Lymph Nodes:  No lymphadenopathy present    Thyroid:  Gland size normal, nontender, no nodules or masses present  on palpation  Chest:  Respiratory Effort:  Breathing unlabored  Cardiovascular:    Heart: Auscultation:  Regular rate, normal rhythm, no murmurs present  Breasts: Palpation of Breasts and Axillae:  No masses present on palpation, no breast tenderness., Axillary Lymph Nodes:  No lymphadenopathy present., and No nodularity, asymmetry or nipple discharge bilaterally.  Gastrointestinal:   Abdominal Examination:  Abdomen nontender to palpation, tone normal without rigidity or guarding, no masses present, umbilicus without lesions   Liver and Spleen:  No hepatomegaly present, liver nontender to palpation    Hernias:  No hernias present  Lymphatic: Lymph Nodes:  No other lymphadenopathy present  Skin:  General Inspection:  No rashes present, no lesions present, no areas of  discoloration  Neurologic:    Mental Status:  Oriented X3.  Normal strength and tone, sensory exam                grossly normal, mentation intact and speech normal.    Psychiatric:   Mentation appears normal and affect normal/bright.         Pelvic Exam:  External Genitalia:     Normal appearance for age, no discharge present, no tenderness present, no inflammatory lesions present, color normal  Vagina:     Normal vaginal vault without central or paravaginal defects, ATROPHIC  Bladder:     Nontender to palpation  Urethra:   Urethral Body:  Urethra palpation normal, urethra structural support normal   Urethral Meatus:  No erythema or lesions present  Cervix:     Appearance healthy, no lesions present, nontender to palpation, no bleeding present  Uterus:     Nontender to palpation, ENLARGED UTERUS WITH KNOWN RIGHT FIBROID; MOBILE, NONTENDER, position anteflexed, mobility: normal  Adnexa:     No adnexal tenderness present, no adnexal masses present  Perineum:     Perineum within normal limits, no evidence of trauma, no rashes  or skin lesions present  Inguinal Lymph Nodes:     No lymphadenopathy present    COUNSELING:   Reviewed preventive health counseling, as reflected in patient instructions  Special attention given to:        Regular exercise       Healthy diet/nutrition       Colorectal Cancer Screening       (Leonor)menopause management    BMI: Body mass index is 34.2 kg/m .  Weight management plan: Discussed healthy diet and exercise guidelines Patient was referred to their PCP to discuss a diet and exercise plan.    ASSESSMENT:  54 year old female with satisfactory annual exam.    ICD-10-CM    1. Women's annual routine gynecological examination  Z01.419       2. Symptomatic menopausal or female climacteric states  N95.1 estradiol (ESTRACE) 1 MG tablet      3. Hormone replacement therapy  Z79.890 estradiol (ESTRACE) 1 MG tablet          PLAN:  Patient Instructions   Follow up with your primary care provider for your other medical problems.  [facility] study was discussed in detail including current information.  The patient requests continuation of hormone therapy but agrees to start the tapering process.  Will alternate 2mg/1mg estrace over the next 6 months.  Understands that she may have a slight return of symptoms but would anticipate this is short lived as her body will continue to re-equilibrate to the new dosing  Continue self breast exam.  Increase physical activity and exercise.  Usual safety and preventative measures counseling done.  BMI >25  Weight loss encouraged.  Last pap smear (2021) was normal and negative for the DNA of high risk HPV subtypes.  No pap was obtained this year.  This was discussed with the patient and she agrees with the plan.       Loretta Michel MD

## 2025-02-11 NOTE — PATIENT INSTRUCTIONS
Follow up with your primary care provider for your other medical problems.  WHI study was discussed in detail including current information.  The patient requests continuation of hormone therapy but agrees to start the tapering process.  Will alternate 2mg/1mg estrace over the next 6 months.  Understands that she may have a slight return of symptoms but would anticipate this is short lived as her body will continue to re-equilibrate to the new dosing  Continue self breast exam.  Increase physical activity and exercise.  Usual safety and preventative measures counseling done.  BMI >25  Weight loss encouraged.  Last pap smear (2021) was normal and negative for the DNA of high risk HPV subtypes.  No pap was obtained this year.  This was discussed with the patient and she agrees with the plan.

## 2025-02-13 DIAGNOSIS — Z79.890 HORMONE REPLACEMENT THERAPY: ICD-10-CM

## 2025-02-13 DIAGNOSIS — N95.1 SYMPTOMATIC MENOPAUSAL OR FEMALE CLIMACTERIC STATES: ICD-10-CM

## 2025-02-13 RX ORDER — PROGESTERONE 200 MG/1
200 CAPSULE ORAL DAILY
Qty: 90 CAPSULE | Refills: 3 | Status: SHIPPED | OUTPATIENT
Start: 2025-02-13

## 2025-02-13 NOTE — TELEPHONE ENCOUNTER
Requested Prescriptions   Pending Prescriptions Disp Refills    progesterone (PROMETRIUM) 200 MG capsule 90 capsule 3     Sig: Take 1 capsule (200 mg) by mouth daily.       Hormone Replacement Therapy Passed - 2/13/2025 10:58 AM        Passed - Most recent blood pressure under 140/90 in past 12 months     BP Readings from Last 3 Encounters:   02/11/25 122/78   01/14/25 134/88   02/09/24 124/72       No data recorded            Passed - Medication is active on med list and the sig matches. RN to manually verify dose and sig if red X/fail.     If the protocol passes (green check), you do not need to verify med dose and sig.    A prescription matches if they are the same clinical intention.    For Example: once daily and every morning are the same.    For all fails (red x), verify dose and sig.    If the refill does match what is on file, the RN can still proceed to approve the refill request.     If they do not match, route to the appropriate provider.             Passed - Recent (12 mo) or future (90 days) visit within the authorizing provider's specialty     The patient must have completed an in-person or virtual visit within the past 12 months or has a future visit scheduled within the next 90 days with the authorizing provider s specialty.  Urgent care and e-visits do not qualify as an office visit for this protocol.          Passed - Medication indicated for associated diagnosis     The medication is prescribed for one or more of the following conditions:    Menopause   Vulva/Vaginal atrophy   Low Estrogen   Gender Dysphoria   Male to Female transgender          Passed - Patient is 18 years of age or older        Passed - No active pregnancy on record        Passed - No positive pregnancy test on record in past 12 months           Last Written Prescription Date:  2/9/24  Last Fill Quantity: 90,  # refills: 3   Last office visit: 2/11/2025 ; last virtual visit: Visit date not found with prescribing provider:  Astrid  Brayan     Future Office Visit:

## 2025-02-13 NOTE — TELEPHONE ENCOUNTER
2/11/25 [facility] study was discussed in detail including current information. The patient requests continuation of hormone therapy but agrees to start the tapering process. Will alternate 2mg/1mg estrace over the next 6 months. Understands that she may have a slight return of symptoms but would anticipate this is short lived as her body will continue to re-equilibrate to the new dosing     Last visit discussed tapering estradiol but no new Rx sent for progesterone - routing to provider to advise on refills/dose.    Karla De Jesus RN on 2/13/2025 at 11:06 AM

## 2025-04-14 ENCOUNTER — MYC MEDICAL ADVICE (OUTPATIENT)
Dept: OBGYN | Facility: CLINIC | Age: 55
End: 2025-04-14
Payer: COMMERCIAL

## 2025-04-14 DIAGNOSIS — N95.1 SYMPTOMATIC MENOPAUSAL OR FEMALE CLIMACTERIC STATES: ICD-10-CM

## 2025-04-14 DIAGNOSIS — Z79.890 HORMONE REPLACEMENT THERAPY: ICD-10-CM

## 2025-04-14 NOTE — TELEPHONE ENCOUNTER
2/11/25 OV w DR Michel  HRT since 2018  Discussed weaning off HRT  nuation of hormone therapy but agrees to start the tapering process. Will alternate 2mg/1mg estrace over the next 6 months. Understands that she may have a slight return of symptoms but would anticipate this is short lived as her body will continue to re-equilibrate to the new dosing   .    Routing pt [employer] message to provider to advise.    Karla De Jesus RN on 4/14/2025 at 11:41 AM

## 2025-04-15 RX ORDER — ESTRADIOL 2 MG/1
2 TABLET ORAL DAILY
Qty: 180 TABLET | Refills: 2 | Status: SHIPPED | OUTPATIENT
Start: 2025-04-15

## 2025-04-15 NOTE — TELEPHONE ENCOUNTER
Updated Rx & discontinued 1mg dosing  Requested Prescriptions   Pending Prescriptions Disp Refills    estradiol (ESTRACE) 2 MG tablet 180 tablet 2     Sig: Take 1 tablet (2 mg) by mouth daily.       There is no refill protocol information for this order        OK per Dr Anand Ramires, RN on 4/15/2025 at 12:53 PM   [facility]

## 2025-04-15 NOTE — TELEPHONE ENCOUNTER
Fine to go back to the 2mg dosing but I'm a bit surprised at the extent of her symptoms from such a minimal dose change with alternating 2mg/1mg each day.  I would encourage to her to monitor for any other changes in life to explain her reported symptoms honestly and most definitely if her symptoms do not improve with going back to the 2mg daily dosing.  Would maybe consider following up with her PCP as well.  I simply would not have expected such a drastic change with such a minimal dose change

## 2025-07-31 ENCOUNTER — APPOINTMENT (OUTPATIENT)
Age: 55
Setting detail: DERMATOLOGY
End: 2025-07-31

## 2025-07-31 DIAGNOSIS — D22 MELANOCYTIC NEVI: ICD-10-CM

## 2025-07-31 DIAGNOSIS — Z85.828 PERSONAL HISTORY OF OTHER MALIGNANT NEOPLASM OF SKIN: ICD-10-CM

## 2025-07-31 DIAGNOSIS — Z87.2 PERSONAL HISTORY OF DISEASES OF THE SKIN AND SUBCUTANEOUS TISSUE: ICD-10-CM

## 2025-07-31 DIAGNOSIS — L82.1 OTHER SEBORRHEIC KERATOSIS: ICD-10-CM

## 2025-07-31 DIAGNOSIS — D18.0 HEMANGIOMA: ICD-10-CM

## 2025-07-31 DIAGNOSIS — Z71.89 OTHER SPECIFIED COUNSELING: ICD-10-CM

## 2025-07-31 PROBLEM — D22.5 MELANOCYTIC NEVI OF TRUNK: Status: ACTIVE | Noted: 2025-07-31

## 2025-07-31 PROBLEM — D18.01 HEMANGIOMA OF SKIN AND SUBCUTANEOUS TISSUE: Status: ACTIVE | Noted: 2025-07-31

## 2025-07-31 PROCEDURE — ? OBSERVATION

## 2025-07-31 PROCEDURE — ? SUNSCREEN RECOMMENDATIONS

## 2025-07-31 PROCEDURE — ? COUNSELING

## 2025-07-31 ASSESSMENT — LOCATION SIMPLE DESCRIPTION DERM
LOCATION SIMPLE: RIGHT LOWER BACK
LOCATION SIMPLE: UPPER BACK
LOCATION SIMPLE: CHEST
LOCATION SIMPLE: LEFT FOREHEAD
LOCATION SIMPLE: CHIN
LOCATION SIMPLE: ABDOMEN
LOCATION SIMPLE: LEFT THIGH

## 2025-07-31 ASSESSMENT — LOCATION ZONE DERM
LOCATION ZONE: TRUNK
LOCATION ZONE: LEG
LOCATION ZONE: FACE

## 2025-07-31 ASSESSMENT — LOCATION DETAILED DESCRIPTION DERM
LOCATION DETAILED: SUPERIOR THORACIC SPINE
LOCATION DETAILED: EPIGASTRIC SKIN
LOCATION DETAILED: MIDDLE STERNUM
LOCATION DETAILED: LEFT ANTERIOR DISTAL THIGH
LOCATION DETAILED: LEFT INFERIOR MEDIAL FOREHEAD
LOCATION DETAILED: LEFT CHIN
LOCATION DETAILED: RIGHT SUPERIOR MEDIAL MIDBACK
LOCATION DETAILED: INFERIOR THORACIC SPINE

## 2025-07-31 NOTE — HPI: DYSPLASTIC NEVUS F/U (HISTORY OF DYSPLASTIC NEVUS)
What Is The Reason For Today's Visit?: Follow Up Dysplastic Nevus
Additional History: Two biopsies on the left anterior thigh and the right upper back - compound nevus with moderate dysplasia back in 2014

## 2025-07-31 NOTE — PROCEDURE: OBSERVATION
Body Location Override (Optional - Billing Will Still Be Based On Selected Body Map Location If Applicable): left anterior thigh and the right upper back
Detail Level: Detailed
Size Of Lesion In Cm (Optional): 0
Body Location Override (Optional - Billing Will Still Be Based On Selected Body Map Location If Applicable): chin

## 2025-07-31 NOTE — HPI: NON-MELANOMA SKIN CANCER F/U (HISTORY OF NMSC)
How Many Skin Cancers Have You Had?: one
What Is The Reason For Today's Visit?: History of Non-Melanoma Skin Cancer
Additional History: BCC removed with Mohs on the chin on 10/13/2009

## 2025-08-18 ENCOUNTER — OFFICE VISIT (OUTPATIENT)
Dept: FAMILY MEDICINE | Facility: CLINIC | Age: 55
End: 2025-08-18
Payer: COMMERCIAL

## 2025-08-18 VITALS
SYSTOLIC BLOOD PRESSURE: 133 MMHG | DIASTOLIC BLOOD PRESSURE: 90 MMHG | WEIGHT: 187 LBS | HEART RATE: 76 BPM | HEIGHT: 63 IN | BODY MASS INDEX: 33.13 KG/M2 | OXYGEN SATURATION: 99 % | TEMPERATURE: 98.1 F | RESPIRATION RATE: 20 BRPM

## 2025-08-18 DIAGNOSIS — R05.1 ACUTE COUGH: ICD-10-CM

## 2025-08-18 DIAGNOSIS — R00.2 PALPITATIONS: Primary | ICD-10-CM

## 2025-08-18 DIAGNOSIS — R09.82 PND (POST-NASAL DRIP): ICD-10-CM

## 2025-08-18 LAB — FINAL INTERPRETATION: NORMAL

## 2025-08-18 PROCEDURE — 93000 ELECTROCARDIOGRAM COMPLETE: CPT

## 2025-08-18 PROCEDURE — 3075F SYST BP GE 130 - 139MM HG: CPT

## 2025-08-18 PROCEDURE — 99204 OFFICE O/P NEW MOD 45 MIN: CPT

## 2025-08-18 PROCEDURE — 3080F DIAST BP >= 90 MM HG: CPT

## 2025-08-18 RX ORDER — FLUTICASONE PROPIONATE 50 MCG
1-2 SPRAY, SUSPENSION (ML) NASAL DAILY
Qty: 16 G | Refills: 0 | Status: SHIPPED | OUTPATIENT
Start: 2025-08-18